# Patient Record
Sex: FEMALE | Race: WHITE | NOT HISPANIC OR LATINO | Employment: FULL TIME | URBAN - METROPOLITAN AREA
[De-identification: names, ages, dates, MRNs, and addresses within clinical notes are randomized per-mention and may not be internally consistent; named-entity substitution may affect disease eponyms.]

---

## 2017-12-05 ENCOUNTER — OFFICE VISIT (OUTPATIENT)
Dept: OBSTETRICS AND GYNECOLOGY | Facility: CLINIC | Age: 57
End: 2017-12-05
Payer: COMMERCIAL

## 2017-12-05 VITALS
HEIGHT: 60 IN | BODY MASS INDEX: 27.57 KG/M2 | SYSTOLIC BLOOD PRESSURE: 120 MMHG | DIASTOLIC BLOOD PRESSURE: 70 MMHG | WEIGHT: 140.44 LBS

## 2017-12-05 DIAGNOSIS — Z01.419 ENCOUNTER FOR GYNECOLOGICAL EXAMINATION (GENERAL) (ROUTINE) WITHOUT ABNORMAL FINDINGS: Primary | ICD-10-CM

## 2017-12-05 DIAGNOSIS — N95.1 HOT FLASHES DUE TO MENOPAUSE: ICD-10-CM

## 2017-12-05 PROCEDURE — 99386 PREV VISIT NEW AGE 40-64: CPT | Mod: S$GLB,,, | Performed by: OBSTETRICS & GYNECOLOGY

## 2017-12-05 PROCEDURE — 99999 PR PBB SHADOW E&M-NEW PATIENT-LVL III: CPT | Mod: PBBFAC,,, | Performed by: OBSTETRICS & GYNECOLOGY

## 2017-12-05 RX ORDER — WARFARIN 10 MG/1
10 TABLET ORAL DAILY
COMMUNITY
End: 2018-03-20

## 2017-12-05 NOTE — PROGRESS NOTES
Chief Complaint   Patient presents with    Well Woman     want to discuss hormone       HPI:  57 y.o. female  presents as a new patient for a well woman exam    - Menopausal: 6-7 YEARS    - History of abnormal paps: [facility] OR LEEP ABOUT 20 YEARS AGO; NORMAL PAPS SINCE THEN  - Postmenopausal bleeding: S/P HYSTERECTOMY  - History of abnormal mammograms: BREAST BIOPSY A FEW YEARS AGO THAT WAS NORMAL; NORMAL MAMMOGRAMS SINCE THEN  - Family history of breast or ovarian cancer: DENIES  - Any breast masses, pain, skin changes, or nipple discharge: DENIES      Pap: No result found, S/P HYSTERECTOMY AND 20 YEARS FROM POSSIBLE DIAGNOSIS OF HIGH GRADE DYSPLASIA  Mammogram: 2016, NORMAL PER PATIENT REPORT    History reviewed. No pertinent past medical history.  Past Surgical History:   Procedure Laterality Date    GALLBLADDER SURGERY      HYSTERECTOMY      MITRAL VALVE REPLACEMENT         Social History   Substance Use Topics    Smoking status: Never Smoker    Smokeless tobacco: Never Used    Alcohol use No     Family History   Problem Relation Age of Onset    Colon cancer Maternal Uncle     Breast cancer Neg Hx     Ovarian cancer Neg Hx      OB History    Para Term  AB Living   3 2 2   1     SAB TAB Ectopic Multiple Live Births   1              # Outcome Date GA Lbr Yanick/2nd Weight Sex Delivery Anes PTL Lv   3 Term      Vag-Spont      2 Term      Vag-Spont      1 SAB                   MEDICATIONS: Reviewed with patient.  ALLERGIES: Patient has no known allergies.     ROS:  Review of Systems   Constitutional: Negative for fever.   Respiratory: Negative for shortness of breath.    Cardiovascular: Negative for chest pain.   Gastrointestinal: Negative for abdominal pain, nausea and vomiting.   Endocrine: Positive for hot flashes.   Genitourinary: Negative for dysuria, pelvic pain and postmenopausal bleeding.   Neurological: Negative for headaches.   Hematological: Does not bruise/bleed easily.    Psychiatric/Behavioral: Negative for depression.   Breast: Negative for breast mass, breast pain, nipple discharge and skin changes      PHYSICAL EXAM:    /70   Ht 5' (1.524 m)   Wt 63.7 kg (140 lb 6.9 oz)   LMP  (LMP Unknown) Comment: hysterectomy  BMI 27.43 kg/m²     Physical Exam:   Constitutional: She is oriented to person, place, and time. She appears well-developed.   No fever    HENT:   Head: Normocephalic.     Neck: No thyromegaly present.    Cardiovascular: Normal rate.     Pulmonary/Chest: Effort normal. Right breast exhibits no mass, no nipple discharge, no skin change, no tenderness and no swelling. Left breast exhibits no mass, no nipple discharge, no skin change, no tenderness and no swelling. Breasts are symmetrical.        Abdominal: She exhibits no mass. There is no hepatosplenomegaly. There is no tenderness.     Genitourinary: Vagina normal. Uterus is absent. Cervix is normal. Right adnexum displays no tenderness and no fullness. Left adnexum displays no tenderness and no fullness. No vaginal discharge found. Cervix exhibits absence.   Genitourinary Comments: External genitalia: Normal  Urethra: No tenderness; normal meatus  Bladder: No tenderness              Lymphadenopathy:     She has no cervical adenopathy.    Neurological: She is alert and oriented to person, place, and time.     Psychiatric: She has a normal mood and affect.         ASSESSMENT & PLAN:   Encounter for gynecological examination (general) (routine) without abnormal findings  -     Mammo Digital Screening Bilat with CAD; Future; Expected date: 12/05/2017    Hot flashes due to menopause        - Breast and pelvic exam: NORMAL BREAST AND POST-HYSTERECTOMY PELVIC EXAM  - Patient was counseled on ASCCP guidelines for cervical cytology screening  - Cervical screening: NOT INDICATED; S/P HYSTERECTOMY AND MORE THAN 20 YEARS SINCE POSSIBLE DIAGNOSIS OF HIGH GRADE CERVICAL DYSPLASIA  - Patient was counseled on current  recommendations for breast cancer screening  - Mammogram screening: WILL SCHEDULE MAMMOGRAM    - She was counseled to follow up with her PCP for other routine health maintenance    - Patient has been on ERT for about 6-7 years  - No ERT over the past 2 months; she notes continued symptoms but not as many as when she started ERT    Patient counseled on the benefit-risk ratio of hormonal replacement therapy including  - Improvement in vasomotor and other climacteric symptoms, including possible improvements in sleep and mood.   - Reduction of risk for osteoporosis  - Study data showing increased risk of thrombo-embolic events such as myocardial infarction and stroke and also possibly increased risk of breast cancer with estrogen replacement  - Side effects such as breast tenderness and weight gain  Patient was also counseled on ACOG's recommendation to use hormone replacement therapy for the relief of hot flashes alone and to be on the lowest dose possible for the shortest amount of time.  Alternative therapy such as herbal and soy-based products, comfort measures, and low-dose SSRIs were reviewed.  All of her questions about this therapy were answered.    - Given the above recommendations and her increased risk of thrombo-embolic events with her mechanical valve, she decided not to restart ERT at this time  - RTC in 3-4 months to re-assess at that time

## 2018-03-06 ENCOUNTER — OFFICE VISIT (OUTPATIENT)
Dept: OBSTETRICS AND GYNECOLOGY | Facility: CLINIC | Age: 58
End: 2018-03-06
Payer: COMMERCIAL

## 2018-03-06 VITALS
DIASTOLIC BLOOD PRESSURE: 70 MMHG | HEIGHT: 62 IN | SYSTOLIC BLOOD PRESSURE: 120 MMHG | WEIGHT: 144.19 LBS | BODY MASS INDEX: 26.53 KG/M2

## 2018-03-06 DIAGNOSIS — N95.1 HOT FLASHES DUE TO MENOPAUSE: Primary | ICD-10-CM

## 2018-03-06 PROCEDURE — 99999 PR PBB SHADOW E&M-EST. PATIENT-LVL III: CPT | Mod: PBBFAC,,, | Performed by: OBSTETRICS & GYNECOLOGY

## 2018-03-06 PROCEDURE — 99213 OFFICE O/P EST LOW 20 MIN: CPT | Mod: S$GLB,,, | Performed by: OBSTETRICS & GYNECOLOGY

## 2018-03-06 RX ORDER — AMOXICILLIN 500 MG/1
CAPSULE ORAL
COMMUNITY
Start: 2018-02-05 | End: 2018-03-20

## 2018-03-06 RX ORDER — WARFARIN SODIUM 5 MG/1
5 TABLET ORAL DAILY
COMMUNITY
Start: 2018-01-06

## 2018-03-09 ENCOUNTER — PATIENT MESSAGE (OUTPATIENT)
Dept: PRIMARY CARE CLINIC | Facility: CLINIC | Age: 58
End: 2018-03-09

## 2018-03-15 ENCOUNTER — TELEPHONE (OUTPATIENT)
Dept: PRIMARY CARE CLINIC | Facility: CLINIC | Age: 58
End: 2018-03-15

## 2018-03-15 NOTE — TELEPHONE ENCOUNTER
----- Message from Shirley Angel sent at 3/15/2018  1:01 PM CDT -----  Contact: Self   Pt is requesting a call back in regards to scheduling her annual visit       Pt can be contacted at [phone]

## 2018-03-20 ENCOUNTER — OFFICE VISIT (OUTPATIENT)
Dept: PRIMARY CARE CLINIC | Facility: CLINIC | Age: 58
End: 2018-03-20
Payer: COMMERCIAL

## 2018-03-20 VITALS
TEMPERATURE: 98 F | DIASTOLIC BLOOD PRESSURE: 63 MMHG | OXYGEN SATURATION: 97 % | SYSTOLIC BLOOD PRESSURE: 115 MMHG | BODY MASS INDEX: 26.31 KG/M2 | RESPIRATION RATE: 18 BRPM | HEIGHT: 62 IN | WEIGHT: 143 LBS | HEART RATE: 72 BPM

## 2018-03-20 DIAGNOSIS — R51.9 NONINTRACTABLE HEADACHE, UNSPECIFIED CHRONICITY PATTERN, UNSPECIFIED HEADACHE TYPE: ICD-10-CM

## 2018-03-20 DIAGNOSIS — C44.622 SQUAMOUS CELL CANCER OF SKIN OF HAND, RIGHT: ICD-10-CM

## 2018-03-20 DIAGNOSIS — Z95.2 HISTORY OF MITRAL VALVE REPLACEMENT: Primary | ICD-10-CM

## 2018-03-20 PROCEDURE — 99999 PR PBB SHADOW E&M-EST. PATIENT-LVL III: CPT | Mod: PBBFAC,,, | Performed by: FAMILY MEDICINE

## 2018-03-20 PROCEDURE — 99396 PREV VISIT EST AGE 40-64: CPT | Mod: S$GLB,,, | Performed by: FAMILY MEDICINE

## 2018-03-20 RX ORDER — ROSUVASTATIN CALCIUM 20 MG/1
20 TABLET, COATED ORAL EVERY OTHER DAY
COMMUNITY
Start: 2018-03-13

## 2018-03-20 RX ORDER — BUTALBITAL, ACETAMINOPHEN AND CAFFEINE 50; 325; 40 MG/1; MG/1; MG/1
1 TABLET ORAL EVERY 4 HOURS PRN
Qty: 30 TABLET | Refills: 1 | Status: SHIPPED | OUTPATIENT
Start: 2018-03-20 | End: 2018-04-19

## 2018-03-20 NOTE — PROGRESS NOTES
Subjective:       Patient ID: Abby Nguyen is a 57 y.o. female.    Chief Complaint: Annual Exam (wellness visit )    HPI: 56 yo WF Wellness --  Had lab in December--.  No specific complaints .  Had echocardiogram within normal limits with Dr. Gross    ROS:  Skin: no psoriasis, eczema, +skin cancer--dorsum of the right hand 2 years ago--squamous cell  HEENT: Occas headache,no  ocular pain, blurred vision, diplopia, epistaxis, hoarseness change in voice, thyroid trouble  Lung: No pneumonia, asthma, Tb, wheezing, SOB, +smoking  Heart: No chest pain, ankle edema, palpitations, MI, oni murmur, hypertension, hyperlipidemia + mitral valve replacement-mechanical on Coumadin  Abdomen: No nausea, vomiting, diarrhea, constipation, ulcers, hepatitis, gallbladder disease, melena, hematochezia, hematemesis  : no UTI, renal disease, stones  MS: no fractures, O/A, lupus, rheumatoid, gout   Neuro: No dizziness, LOC, seizures   No diabetes, no anemia, +  anxiety, no depression        Objective:   Physical Exam:  General: Well nourished, well developed, no acute distress  Skin: No lesions  HEENT: Eyes PERRLA, EOM intact, nose patent, throat non-erythematous   NECK: Supple, no bruits, No JVD, no nodes  Lungs: Clear, no rales, rhonchi, wheezing  Heart: Regular rate and rhythm, no murmurs, gallops, or rubs  Abdomen: flat, bowel sounds positive, no tenderness, or organomegaly  MS: Range of motion and muscle strength intact  Neuro: Alert, CN intact, oriented X 3  Extremities: No cyanosis, clubbing, or edema         Assessment:       1. History of mitral valve replacement    2. Squamous cell cancer of skin of hand, right    3. Nonintractable headache, unspecified chronicity pattern, unspecified headache type        Plan:       History of mitral valve replacement    Squamous cell cancer of skin of hand, right    Nonintractable headache, unspecified chronicity pattern, unspecified headache type    Other orders  -      butalbital-acetaminophen-caffeine -40 mg (FIORICET, ESGIC) -40 mg per tablet; Take 1 tablet by mouth every 4 (four) hours as needed.  Dispense: 30 tablet; Refill: 1      Check PT INR monthly.   Hormones --OBGYN -- on coumadin  Fioricet #30 ! Refill

## 2018-03-23 NOTE — PROGRESS NOTES
"Chief Complaint   Patient presents with    Consult     hormones       HPI:  57 y.o. female     - Menopausal: 6-7 YEARS    - Patient returns to discuss ERT  - Patient was on ERT for about 6-7 years  - She stopped about 6 months ago  - Initially after stopping her symptoms were not too bad, but over the past couple months, she notes more frequent hot flashes  - She has a history of mitral valve replacement and is on coumadin      Pap: No result found, S/P HYSTERECTOMY AND 20 YEARS FROM POSSIBLE DIAGNOSIS OF HIGH GRADE DYSPLASIA  Mammogram: 2018. BIRADS1    History reviewed. No pertinent past medical history.  Past Surgical History:   Procedure Laterality Date    GALLBLADDER SURGERY      HYSTERECTOMY      MITRAL VALVE REPLACEMENT         Social History   Substance Use Topics    Smoking status: Never Smoker    Smokeless tobacco: Never Used    Alcohol use No     Family History   Problem Relation Age of Onset    Colon cancer Maternal Uncle     Breast cancer Neg Hx     Ovarian cancer Neg Hx      OB History    Para Term  AB Living   3 2 2   1     SAB TAB Ectopic Multiple Live Births   1              # Outcome Date GA Lbr Yanick/2nd Weight Sex Delivery Anes PTL Lv   3 Term      Vag-Spont      2 Term      Vag-Spont      1 SAB                   MEDICATIONS: Reviewed with patient.  ALLERGIES: Dilantin [phenytoin sodium extended] and Phenergan [promethazine]     ROS:  Review of Systems   Constitutional: Negative for fever.   Respiratory: Negative for shortness of breath.    Cardiovascular: Negative for chest pain.   Gastrointestinal: Negative for abdominal pain, nausea and vomiting.   Endocrine: Positive for hot flashes.   Genitourinary: Negative for pelvic pain and postmenopausal bleeding.   Neurological: Negative for headaches.       PHYSICAL EXAM:    /70   Ht 5' 2" (1.575 m)   Wt 65.4 kg (144 lb 2.9 oz)   LMP  (LMP Unknown) Comment: hysterectomy  BMI 26.37 kg/m²     Physical Exam: "   Constitutional: She is oriented to person, place, and time. She appears well-developed.    HENT:   Head: Normocephalic.       Pulmonary/Chest: Effort normal.                      Neurological: She is alert and oriented to person, place, and time.     Psychiatric: She has a normal mood and affect.         ASSESSMENT & PLAN:   Hot flashes due to menopause        Patient counseled on the benefit-risk ratio of hormonal replacement therapy including  - Improvement in vasomotor and other climacteric symptoms, including possible improvements in sleep and mood.   - Reduction of risk for osteoporosis  - Study data showing increased risk of thrombo-embolic events such as myocardial infarction and stroke and also possibly increased risk of breast cancer with estrogen replacement  - Side effects such as breast tenderness and weight gain  Patient was also counseled on ACOG's recommendation to use hormone replacement therapy for the relief of hot flashes alone and to be on the lowest dose possible for the shortest amount of time.  Alternative therapy such as herbal and soy-based products, comfort measures, and low-dose SSRIs were reviewed.  All of her questions about this therapy were answered.    - Given the above recommendations and her increased risk of thrombo-embolic events with her mechanical valve, I recommended not re-starting ERT  - Patient declined low-dose SSRIs    Total visit time was 15 minutes with greater than 50% of time dedicated to counseling.

## 2019-03-15 ENCOUNTER — OFFICE VISIT (OUTPATIENT)
Dept: PRIMARY CARE CLINIC | Facility: CLINIC | Age: 59
End: 2019-03-15
Payer: COMMERCIAL

## 2019-03-15 VITALS
HEART RATE: 85 BPM | TEMPERATURE: 100 F | SYSTOLIC BLOOD PRESSURE: 128 MMHG | OXYGEN SATURATION: 99 % | RESPIRATION RATE: 18 BRPM | BODY MASS INDEX: 26.31 KG/M2 | WEIGHT: 143 LBS | HEIGHT: 62 IN | DIASTOLIC BLOOD PRESSURE: 70 MMHG

## 2019-03-15 DIAGNOSIS — Z95.2 HISTORY OF MITRAL VALVE REPLACEMENT: ICD-10-CM

## 2019-03-15 DIAGNOSIS — F41.9 ANXIETY: ICD-10-CM

## 2019-03-15 DIAGNOSIS — C44.622 SQUAMOUS CELL CANCER OF SKIN OF HAND, RIGHT: ICD-10-CM

## 2019-03-15 DIAGNOSIS — F32.A DEPRESSION, UNSPECIFIED DEPRESSION TYPE: ICD-10-CM

## 2019-03-15 DIAGNOSIS — J40 BRONCHITIS: ICD-10-CM

## 2019-03-15 DIAGNOSIS — J32.9 SINUSITIS, UNSPECIFIED CHRONICITY, UNSPECIFIED LOCATION: Primary | ICD-10-CM

## 2019-03-15 PROCEDURE — 99999 PR PBB SHADOW E&M-EST. PATIENT-LVL III: CPT | Mod: PBBFAC,,, | Performed by: FAMILY MEDICINE

## 2019-03-15 PROCEDURE — 99213 OFFICE O/P EST LOW 20 MIN: CPT | Mod: 25,S$GLB,, | Performed by: FAMILY MEDICINE

## 2019-03-15 PROCEDURE — 96372 THER/PROPH/DIAG INJ SC/IM: CPT | Mod: S$GLB,,, | Performed by: FAMILY MEDICINE

## 2019-03-15 PROCEDURE — 99213 PR OFFICE/OUTPT VISIT, EST, LEVL III, 20-29 MIN: ICD-10-PCS | Mod: 25,S$GLB,, | Performed by: FAMILY MEDICINE

## 2019-03-15 PROCEDURE — 96372 PR INJECTION,THERAP/PROPH/DIAG2ST, IM OR SUBCUT: ICD-10-PCS | Mod: S$GLB,,, | Performed by: FAMILY MEDICINE

## 2019-03-15 PROCEDURE — 3008F BODY MASS INDEX DOCD: CPT | Mod: CPTII,S$GLB,, | Performed by: FAMILY MEDICINE

## 2019-03-15 PROCEDURE — 3008F PR BODY MASS INDEX (BMI) DOCUMENTED: ICD-10-PCS | Mod: CPTII,S$GLB,, | Performed by: FAMILY MEDICINE

## 2019-03-15 PROCEDURE — 99999 PR PBB SHADOW E&M-EST. PATIENT-LVL III: ICD-10-PCS | Mod: PBBFAC,,, | Performed by: FAMILY MEDICINE

## 2019-03-15 RX ORDER — AZITHROMYCIN 250 MG/1
TABLET, FILM COATED ORAL
Qty: 6 TABLET | Refills: 0 | Status: SHIPPED | OUTPATIENT
Start: 2019-03-15 | End: 2019-03-19

## 2019-03-15 RX ORDER — TRIAMCINOLONE ACETONIDE 40 MG/ML
40 INJECTION, SUSPENSION INTRA-ARTICULAR; INTRAMUSCULAR
Status: COMPLETED | OUTPATIENT
Start: 2019-03-15 | End: 2019-03-15

## 2019-03-15 RX ORDER — CODEINE PHOSPHATE AND GUAIFENESIN 10; 100 MG/5ML; MG/5ML
5 SOLUTION ORAL EVERY 6 HOURS PRN
Qty: 180 ML | Refills: 0 | Status: SHIPPED | OUTPATIENT
Start: 2019-03-15 | End: 2021-01-25

## 2019-03-15 RX ORDER — METHYLPREDNISOLONE 4 MG/1
TABLET ORAL
Qty: 1 PACKAGE | Refills: 0 | Status: SHIPPED | OUTPATIENT
Start: 2019-03-15 | End: 2021-01-25

## 2019-03-15 RX ADMIN — TRIAMCINOLONE ACETONIDE 40 MG: 40 INJECTION, SUSPENSION INTRA-ARTICULAR; INTRAMUSCULAR at 03:03

## 2019-03-15 NOTE — PROGRESS NOTES
Subjective:       Patient ID: Abby Nguyen is a 58 y.o. female.    Chief Complaint: Cough (congestion) and Generalized Body Aches    HPI 57 yo WF -sick x 4-5 days --no fever, no runny nose but did have a postnasal drip--thought had sinus, +sore throat, +cough, +phlegm--yellow.  No pneumonia asthma TB no smoking no wheezing    ROS:  Skin: no psoriasis, eczema, +skin cancer--dorsum of the right hand 2 years ago--squamous cell  HEENT: no  headache,no  ocular pain, blurred vision, diplopia, epistaxis, hoarseness change in voice, thyroid trouble  Lung: No pneumonia, asthma, Tb, wheezing, SOB, +smoking  Heart: No chest pain, ankle edema, palpitations, MI, oni murmur, hypertension, hyperlipidemia + mitral valve replacement-mechanical on Coumadin saw Dr Ginny Cadena   Abdomen: No nausea, vomiting, diarrhea, constipation, ulcers, hepatitis, gallbladder disease, melena, hematochezia, hematemesis  : no UTI, renal disease, stones  GYN hyst last mammogram due   MS: no fractures, O/A, lupus, rheumatoid, gout   Neuro: No dizziness, LOC, seizures   No diabetes, no anemia, +  anxiety, + depression-- since   3 years.     , pt nurse  at hospital  Lives alone       Objective:   Physical Exam:  General: Well nourished, well developed, no acute distress  Skin: No lesions  HEENT: Eyes PERRLA, EOM intact, nose patent, throat +1/4 erythematous   NECK: Supple, no bruits, No JVD, no nodes  Lungs: Clear, no rales, rhonchi, wheezing + coarse cough   Heart: Regular rate and rhythm, no murmurs, gallops, or rubs  Abdomen: flat, bowel sounds positive, no tenderness, or organomegaly  MS: Range of motion and muscle strength intact  Neuro: Alert, CN intact, oriented X 3  Extremities: No cyanosis, clubbing, or edema         Assessment:       1. Sinusitis, unspecified chronicity, unspecified location    2. Bronchitis    3. Anxiety    4. Depression, unspecified depression type    5. History of mitral valve replacement    6.  Squamous cell cancer of skin of hand, right        Plan:       Sinusitis, unspecified chronicity, unspecified location    Bronchitis    Anxiety    Depression, unspecified depression type    History of mitral valve replacement    Squamous cell cancer of skin of hand, right        Flu swab  Kenalog/Zithromax/Medrol/tussi organidan  Depression and anxiety over death  3 years ago   If patient is not better by Monday may switch to Levaquin do not feel Tamiflu is probably indicated at this time as patient is been sick since Monday  If feels better should be able return to work on Monday

## 2019-03-15 NOTE — PROGRESS NOTES
Patient ID verified by name and . Allergies reviewed with patient. Kenalog 40 mg IM injection given in right VG using aseptic technique. Aspirated with no blood noted. Patient tolerated well. Given per physicians order. No adverse reaction noted.

## 2019-03-20 DIAGNOSIS — Z12.11 COLON CANCER SCREENING: ICD-10-CM

## 2020-02-20 DIAGNOSIS — Z12.39 BREAST CANCER SCREENING: ICD-10-CM

## 2020-04-21 DIAGNOSIS — Z01.84 ANTIBODY RESPONSE EXAMINATION: ICD-10-CM

## 2020-05-14 DIAGNOSIS — Z12.11 COLON CANCER SCREENING: ICD-10-CM

## 2020-09-17 ENCOUNTER — PATIENT MESSAGE (OUTPATIENT)
Dept: ADMINISTRATIVE | Facility: HOSPITAL | Age: 60
End: 2020-09-17

## 2020-10-05 ENCOUNTER — PATIENT MESSAGE (OUTPATIENT)
Dept: ADMINISTRATIVE | Facility: HOSPITAL | Age: 60
End: 2020-10-05

## 2020-12-15 ENCOUNTER — IMMUNIZATION (OUTPATIENT)
Dept: PRIMARY CARE CLINIC | Facility: CLINIC | Age: 60
End: 2020-12-15
Payer: COMMERCIAL

## 2020-12-15 DIAGNOSIS — Z23 NEED FOR VACCINATION: ICD-10-CM

## 2020-12-15 PROCEDURE — 0001A COVID-19, MRNA, LNP-S, PF, 30 MCG/0.3 ML DOSE VACCINE: ICD-10-PCS | Mod: CV19,,, | Performed by: FAMILY MEDICINE

## 2020-12-15 PROCEDURE — 91300 COVID-19, MRNA, LNP-S, PF, 30 MCG/0.3 ML DOSE VACCINE: ICD-10-PCS | Mod: ,,, | Performed by: FAMILY MEDICINE

## 2020-12-15 PROCEDURE — 91300 COVID-19, MRNA, LNP-S, PF, 30 MCG/0.3 ML DOSE VACCINE: CPT | Mod: ,,, | Performed by: FAMILY MEDICINE

## 2020-12-15 PROCEDURE — 0001A COVID-19, MRNA, LNP-S, PF, 30 MCG/0.3 ML DOSE VACCINE: CPT | Mod: CV19,,, | Performed by: FAMILY MEDICINE

## 2021-01-04 ENCOUNTER — PATIENT MESSAGE (OUTPATIENT)
Dept: ADMINISTRATIVE | Facility: HOSPITAL | Age: 61
End: 2021-01-04

## 2021-01-05 ENCOUNTER — IMMUNIZATION (OUTPATIENT)
Dept: PRIMARY CARE CLINIC | Facility: CLINIC | Age: 61
End: 2021-01-05
Payer: COMMERCIAL

## 2021-01-05 DIAGNOSIS — Z23 NEED FOR VACCINATION: ICD-10-CM

## 2021-01-05 PROCEDURE — 0002A COVID-19, MRNA, LNP-S, PF, 30 MCG/0.3 ML DOSE VACCINE: CPT | Mod: PBBFAC | Performed by: EMERGENCY MEDICINE

## 2021-01-05 PROCEDURE — 91300 COVID-19, MRNA, LNP-S, PF, 30 MCG/0.3 ML DOSE VACCINE: CPT | Mod: PBBFAC | Performed by: EMERGENCY MEDICINE

## 2021-01-25 ENCOUNTER — OFFICE VISIT (OUTPATIENT)
Dept: PRIMARY CARE CLINIC | Facility: CLINIC | Age: 61
End: 2021-01-25
Payer: COMMERCIAL

## 2021-01-25 VITALS
HEART RATE: 71 BPM | DIASTOLIC BLOOD PRESSURE: 68 MMHG | TEMPERATURE: 98 F | SYSTOLIC BLOOD PRESSURE: 110 MMHG | RESPIRATION RATE: 19 BRPM | OXYGEN SATURATION: 97 % | WEIGHT: 125.44 LBS | HEIGHT: 62 IN | BODY MASS INDEX: 23.08 KG/M2

## 2021-01-25 DIAGNOSIS — Z85.828 HISTORY OF SKIN CANCER: ICD-10-CM

## 2021-01-25 DIAGNOSIS — Z11.4 ENCOUNTER FOR SCREENING FOR HIV: ICD-10-CM

## 2021-01-25 DIAGNOSIS — R51.9 NONINTRACTABLE HEADACHE, UNSPECIFIED CHRONICITY PATTERN, UNSPECIFIED HEADACHE TYPE: ICD-10-CM

## 2021-01-25 DIAGNOSIS — Z12.31 VISIT FOR SCREENING MAMMOGRAM: ICD-10-CM

## 2021-01-25 DIAGNOSIS — Z11.59 NEED FOR HEPATITIS C SCREENING TEST: ICD-10-CM

## 2021-01-25 DIAGNOSIS — Z95.2 HISTORY OF MITRAL VALVE REPLACEMENT: Primary | ICD-10-CM

## 2021-01-25 PROCEDURE — 99999 PR PBB SHADOW E&M-EST. PATIENT-LVL III: ICD-10-PCS | Mod: PBBFAC,,, | Performed by: FAMILY MEDICINE

## 2021-01-25 PROCEDURE — 3008F BODY MASS INDEX DOCD: CPT | Mod: CPTII,S$GLB,, | Performed by: FAMILY MEDICINE

## 2021-01-25 PROCEDURE — 99999 PR PBB SHADOW E&M-EST. PATIENT-LVL III: CPT | Mod: PBBFAC,,, | Performed by: FAMILY MEDICINE

## 2021-01-25 PROCEDURE — 99213 OFFICE O/P EST LOW 20 MIN: CPT | Mod: S$GLB,,, | Performed by: FAMILY MEDICINE

## 2021-01-25 PROCEDURE — 3008F PR BODY MASS INDEX (BMI) DOCUMENTED: ICD-10-PCS | Mod: CPTII,S$GLB,, | Performed by: FAMILY MEDICINE

## 2021-01-25 PROCEDURE — 99213 PR OFFICE/OUTPT VISIT, EST, LEVL III, 20-29 MIN: ICD-10-PCS | Mod: S$GLB,,, | Performed by: FAMILY MEDICINE

## 2021-01-25 PROCEDURE — 1126F AMNT PAIN NOTED NONE PRSNT: CPT | Mod: S$GLB,,, | Performed by: FAMILY MEDICINE

## 2021-01-25 PROCEDURE — 1126F PR PAIN SEVERITY QUANTIFIED, NO PAIN PRESENT: ICD-10-PCS | Mod: S$GLB,,, | Performed by: FAMILY MEDICINE

## 2021-02-23 ENCOUNTER — HOSPITAL ENCOUNTER (OUTPATIENT)
Dept: RADIOLOGY | Facility: CLINIC | Age: 61
Discharge: HOME | End: 2021-02-23
Attending: FAMILY MEDICINE
Payer: COMMERCIAL

## 2021-02-23 DIAGNOSIS — Z12.31 VISIT FOR SCREENING MAMMOGRAM: ICD-10-CM

## 2021-02-23 PROCEDURE — 77063 BREAST TOMOSYNTHESIS BI: CPT | Mod: 26,,, | Performed by: RADIOLOGY

## 2021-02-23 PROCEDURE — 77067 MAMMO DIGITAL SCREENING BILAT WITH TOMO: ICD-10-PCS | Mod: 26,,, | Performed by: RADIOLOGY

## 2021-02-23 PROCEDURE — 77067 SCR MAMMO BI INCL CAD: CPT | Mod: 26,,, | Performed by: RADIOLOGY

## 2021-02-23 PROCEDURE — 77067 SCR MAMMO BI INCL CAD: CPT | Mod: TC,PO

## 2021-02-23 PROCEDURE — 77063 MAMMO DIGITAL SCREENING BILAT WITH TOMO: ICD-10-PCS | Mod: 26,,, | Performed by: RADIOLOGY

## 2021-04-05 ENCOUNTER — PATIENT MESSAGE (OUTPATIENT)
Dept: ADMINISTRATIVE | Facility: HOSPITAL | Age: 61
End: 2021-04-05

## 2021-07-06 ENCOUNTER — PATIENT MESSAGE (OUTPATIENT)
Dept: ADMINISTRATIVE | Facility: HOSPITAL | Age: 61
End: 2021-07-06

## 2021-10-01 ENCOUNTER — IMMUNIZATION (OUTPATIENT)
Dept: PRIMARY CARE CLINIC | Facility: CLINIC | Age: 61
End: 2021-10-01
Payer: COMMERCIAL

## 2021-10-01 DIAGNOSIS — Z23 NEED FOR VACCINATION: Primary | ICD-10-CM

## 2021-10-01 PROCEDURE — 91300 COVID-19, MRNA, LNP-S, PF, 30 MCG/0.3 ML DOSE VACCINE: CPT | Mod: PBBFAC | Performed by: FAMILY MEDICINE

## 2021-10-05 ENCOUNTER — PATIENT MESSAGE (OUTPATIENT)
Dept: ADMINISTRATIVE | Facility: HOSPITAL | Age: 61
End: 2021-10-05

## 2022-03-14 DIAGNOSIS — Z12.31 OTHER SCREENING MAMMOGRAM: ICD-10-CM

## 2022-03-17 ENCOUNTER — PATIENT MESSAGE (OUTPATIENT)
Dept: ADMINISTRATIVE | Facility: HOSPITAL | Age: 62
End: 2022-03-17
Payer: COMMERCIAL

## 2022-04-07 ENCOUNTER — OFFICE VISIT (OUTPATIENT)
Dept: OBSTETRICS AND GYNECOLOGY | Facility: CLINIC | Age: 62
End: 2022-04-07
Payer: COMMERCIAL

## 2022-04-07 VITALS
HEIGHT: 62 IN | SYSTOLIC BLOOD PRESSURE: 108 MMHG | DIASTOLIC BLOOD PRESSURE: 78 MMHG | WEIGHT: 128.31 LBS | BODY MASS INDEX: 23.61 KG/M2

## 2022-04-07 DIAGNOSIS — Z98.890 HISTORY OF CONIZATION OF CERVIX: ICD-10-CM

## 2022-04-07 DIAGNOSIS — Z01.419 WELL WOMAN EXAM WITH ROUTINE GYNECOLOGICAL EXAM: Primary | ICD-10-CM

## 2022-04-07 PROCEDURE — 3078F DIAST BP <80 MM HG: CPT | Mod: CPTII,S$GLB,, | Performed by: STUDENT IN AN ORGANIZED HEALTH CARE EDUCATION/TRAINING PROGRAM

## 2022-04-07 PROCEDURE — 3008F PR BODY MASS INDEX (BMI) DOCUMENTED: ICD-10-PCS | Mod: CPTII,S$GLB,, | Performed by: STUDENT IN AN ORGANIZED HEALTH CARE EDUCATION/TRAINING PROGRAM

## 2022-04-07 PROCEDURE — 99386 PREV VISIT NEW AGE 40-64: CPT | Mod: S$GLB,,, | Performed by: STUDENT IN AN ORGANIZED HEALTH CARE EDUCATION/TRAINING PROGRAM

## 2022-04-07 PROCEDURE — 3078F PR MOST RECENT DIASTOLIC BLOOD PRESSURE < 80 MM HG: ICD-10-PCS | Mod: CPTII,S$GLB,, | Performed by: STUDENT IN AN ORGANIZED HEALTH CARE EDUCATION/TRAINING PROGRAM

## 2022-04-07 PROCEDURE — 1160F PR REVIEW ALL MEDS BY PRESCRIBER/CLIN PHARMACIST DOCUMENTED: ICD-10-PCS | Mod: CPTII,S$GLB,, | Performed by: STUDENT IN AN ORGANIZED HEALTH CARE EDUCATION/TRAINING PROGRAM

## 2022-04-07 PROCEDURE — 99999 PR PBB SHADOW E&M-EST. PATIENT-LVL III: CPT | Mod: PBBFAC,,, | Performed by: STUDENT IN AN ORGANIZED HEALTH CARE EDUCATION/TRAINING PROGRAM

## 2022-04-07 PROCEDURE — 99386 PR PREVENTIVE VISIT,NEW,40-64: ICD-10-PCS | Mod: S$GLB,,, | Performed by: STUDENT IN AN ORGANIZED HEALTH CARE EDUCATION/TRAINING PROGRAM

## 2022-04-07 PROCEDURE — 1160F RVW MEDS BY RX/DR IN RCRD: CPT | Mod: CPTII,S$GLB,, | Performed by: STUDENT IN AN ORGANIZED HEALTH CARE EDUCATION/TRAINING PROGRAM

## 2022-04-07 PROCEDURE — 3074F SYST BP LT 130 MM HG: CPT | Mod: CPTII,S$GLB,, | Performed by: STUDENT IN AN ORGANIZED HEALTH CARE EDUCATION/TRAINING PROGRAM

## 2022-04-07 PROCEDURE — 1159F PR MEDICATION LIST DOCUMENTED IN MEDICAL RECORD: ICD-10-PCS | Mod: CPTII,S$GLB,, | Performed by: STUDENT IN AN ORGANIZED HEALTH CARE EDUCATION/TRAINING PROGRAM

## 2022-04-07 PROCEDURE — 3074F PR MOST RECENT SYSTOLIC BLOOD PRESSURE < 130 MM HG: ICD-10-PCS | Mod: CPTII,S$GLB,, | Performed by: STUDENT IN AN ORGANIZED HEALTH CARE EDUCATION/TRAINING PROGRAM

## 2022-04-07 PROCEDURE — 99999 PR PBB SHADOW E&M-EST. PATIENT-LVL III: ICD-10-PCS | Mod: PBBFAC,,, | Performed by: STUDENT IN AN ORGANIZED HEALTH CARE EDUCATION/TRAINING PROGRAM

## 2022-04-07 PROCEDURE — 3008F BODY MASS INDEX DOCD: CPT | Mod: CPTII,S$GLB,, | Performed by: STUDENT IN AN ORGANIZED HEALTH CARE EDUCATION/TRAINING PROGRAM

## 2022-04-07 PROCEDURE — 1159F MED LIST DOCD IN RCRD: CPT | Mod: CPTII,S$GLB,, | Performed by: STUDENT IN AN ORGANIZED HEALTH CARE EDUCATION/TRAINING PROGRAM

## 2022-04-08 NOTE — PROGRESS NOTES
History & Physical  Gynecology      SUBJECTIVE:     Chief Complaint: Well Woman       History of Present Illness:  61 y.o.   here for [employer]. She is postmenopausal.  She has no unusual complaints.   S/p hysterectomy 25 years ago. Had mitral valve replacement in  and was started on Coumadin. Shortly after had excessive bleeding prompting hysterectomy.  Reports cervical conization for abnormal paps about 10 years before her hysterectomy. States paps after were always normal. Dysplasia had resolved at the time of her hyst.     Sexually active: No    Cervical cancer screening: n/a    Breast cancer screening: due  Colon cancer screening: due, has been putting off. Doesn't really want to do colonoscopy.     Family history breast cancer: no  Family history ovarian cancer: no  Family history colon cancer: maternal aunt and maternal uncle      Review of patient's allergies indicates:   Allergen Reactions    Dilantin [phenytoin sodium extended]     Phenergan [promethazine]        History reviewed. No pertinent past medical history.  Past Surgical History:   Procedure Laterality Date    BREAST BIOPSY Right     benign    GALLBLADDER SURGERY      HYSTERECTOMY      MITRAL VALVE REPLACEMENT       OB History        3    Para   2    Term   2            AB   1    Living           SAB   1    IAB        Ectopic        Multiple        Live Births                   Family History   Problem Relation Age of Onset    Colon cancer Maternal Uncle     Breast cancer Neg Hx     Ovarian cancer Neg Hx      Social History     Tobacco Use    Smoking status: Never Smoker    Smokeless tobacco: Never Used   Substance Use Topics    Alcohol use: Yes     Comment: socially.    Drug use: No       Current Outpatient Medications   Medication Sig    rosuvastatin (CRESTOR) 20 MG tablet Take 20 mg by mouth every other day. Pt takes it every other day    warfarin (COUMADIN) 5 MG tablet Take 5 mg by mouth Daily.      No current  facility-administered medications for this visit.         Review of Systems:  Review of Systems   Constitutional: Negative for chills and fever.   HENT: Negative for nasal congestion.    Respiratory: Negative for cough and shortness of breath.    Cardiovascular: Negative for chest pain, palpitations and leg swelling.   Gastrointestinal: Negative for abdominal pain, blood in stool, constipation, diarrhea, nausea and vomiting.   Endocrine: Negative for diabetes, hyperthyroidism and hypothyroidism.   Genitourinary: Negative for dysuria, pelvic pain, vaginal discharge and vaginal pain.   Musculoskeletal: Negative for myalgias.   Integumentary:  Negative for rash, hair changes, breast mass, nipple discharge, breast skin changes and breast tenderness.   Neurological: Negative for seizures and syncope.   Psychiatric/Behavioral: Negative for depression. The patient is not nervous/anxious.    Breast: Negative for lump, mass, mastodynia, nipple discharge, skin changes and tenderness       OBJECTIVE:     Physical Exam:  Physical Exam  Exam conducted with a chaperone present.   Constitutional:       General: She is not in acute distress.     Appearance: Normal appearance.   HENT:      Head: Normocephalic and atraumatic.      Nose: No epistaxis.   Eyes:      Extraocular Movements: Extraocular movements intact.      Conjunctiva/sclera: Conjunctivae normal.   Pulmonary:      Effort: Pulmonary effort is normal. No respiratory distress.   Chest:   Breasts:      Right: Normal. No swelling, bleeding, inverted nipple, mass, nipple discharge, skin change, tenderness or axillary adenopathy.      Left: Normal. No swelling, bleeding, inverted nipple, mass, nipple discharge, skin change, tenderness or axillary adenopathy.       Abdominal:      General: Abdomen is flat. There is no distension.      Palpations: Abdomen is soft. There is no mass.      Tenderness: There is no abdominal tenderness. There is no guarding or rebound.      Hernia:  No hernia is present.   Genitourinary:     General: Normal vulva.      Labia:         Right: No rash, tenderness or lesion.         Left: No rash, tenderness or lesion.       Urethra: No prolapse, urethral pain, urethral swelling or urethral lesion.      Vagina: Normal. No vaginal discharge, tenderness or bleeding.      Uterus: Absent.       Adnexa: Right adnexa normal and left adnexa normal.        Right: No mass, tenderness or fullness.          Left: No mass, tenderness or fullness.     Musculoskeletal:         General: No swelling or deformity. Normal range of motion.      Cervical back: Normal range of motion and neck supple.      Right lower leg: No edema.      Left lower leg: No edema.   Lymphadenopathy:      Upper Body:      Right upper body: No axillary adenopathy.      Left upper body: No axillary adenopathy.   Skin:     General: Skin is warm and dry.   Neurological:      Mental Status: She is alert and oriented to person, place, and time.   Psychiatric:         Mood and Affect: Mood normal.         Behavior: Behavior normal.           ASSESSMENT:       ICD-10-CM ICD-9-CM    1. Well woman exam with routine gynecological exam  Z01.419 V72.31    2. History of conization of cervix  Z98.890 V45.89           Plan:      Abby was seen today for well woman.    Diagnoses and all orders for this visit:    Well woman exam with routine gynecological exam   Paps deferred indefinitely, see below   MMG ordered per PCP, scheduling facilitated today   Encouraged f/u for colon ca screening; made aware of at-home testing options. To d/w PCP.    History of conization of cervix   Dysplasia treated late 1980s per pt report, would have needed to be followed 20-25 years after conization. Reports she was followed adequately and all paps were normal leading up to her hyst and after her hyst. Based on history does not need further screening. As we do not have pathology/records, offered final pap today. She declines.     Follow up  for [employer], or sooner as needed.     Anna Pérez

## 2022-05-31 ENCOUNTER — HOSPITAL ENCOUNTER (OUTPATIENT)
Dept: RADIOLOGY | Facility: CLINIC | Age: 62
Discharge: HOME | End: 2022-05-31
Attending: FAMILY MEDICINE
Payer: COMMERCIAL

## 2022-05-31 DIAGNOSIS — Z12.31 OTHER SCREENING MAMMOGRAM: ICD-10-CM

## 2022-05-31 PROCEDURE — 77067 SCR MAMMO BI INCL CAD: CPT | Mod: TC,PO

## 2022-05-31 PROCEDURE — 77063 MAMMO DIGITAL SCREENING BILAT WITH TOMO: ICD-10-PCS | Mod: 26,,, | Performed by: RADIOLOGY

## 2022-05-31 PROCEDURE — 77067 MAMMO DIGITAL SCREENING BILAT WITH TOMO: ICD-10-PCS | Mod: 26,,, | Performed by: RADIOLOGY

## 2022-05-31 PROCEDURE — 77063 BREAST TOMOSYNTHESIS BI: CPT | Mod: 26,,, | Performed by: RADIOLOGY

## 2022-05-31 PROCEDURE — 77063 BREAST TOMOSYNTHESIS BI: CPT | Mod: TC,PO

## 2022-05-31 PROCEDURE — 77067 SCR MAMMO BI INCL CAD: CPT | Mod: 26,,, | Performed by: RADIOLOGY

## 2022-09-27 ENCOUNTER — PATIENT OUTREACH (OUTPATIENT)
Dept: ADMINISTRATIVE | Facility: HOSPITAL | Age: 62
End: 2022-09-27
Payer: COMMERCIAL

## 2022-09-27 NOTE — PROGRESS NOTES
Health Maintenance Due   Topic Date Due    TETANUS VACCINE  Never done    Shingles Vaccine (1 of 2) Never done    Colorectal Cancer Screening  Never done    COVID-19 Vaccine (4 - Booster for Pfizer series) 11/26/2021    Influenza Vaccine (1) 09/01/2022          Left VM message for patient to call office to make annual appointment

## 2022-11-01 ENCOUNTER — OFFICE VISIT (OUTPATIENT)
Dept: DERMATOLOGY | Facility: CLINIC | Age: 62
End: 2022-11-01
Payer: COMMERCIAL

## 2022-11-01 DIAGNOSIS — D48.5 NEOPLASM OF UNCERTAIN BEHAVIOR OF SKIN: Primary | ICD-10-CM

## 2022-11-01 PROCEDURE — 99499 NO LOS: ICD-10-PCS | Mod: S$GLB,,, | Performed by: STUDENT IN AN ORGANIZED HEALTH CARE EDUCATION/TRAINING PROGRAM

## 2022-11-01 PROCEDURE — 1160F PR REVIEW ALL MEDS BY PRESCRIBER/CLIN PHARMACIST DOCUMENTED: ICD-10-PCS | Mod: CPTII,S$GLB,, | Performed by: STUDENT IN AN ORGANIZED HEALTH CARE EDUCATION/TRAINING PROGRAM

## 2022-11-01 PROCEDURE — 99499 UNLISTED E&M SERVICE: CPT | Mod: S$GLB,,, | Performed by: STUDENT IN AN ORGANIZED HEALTH CARE EDUCATION/TRAINING PROGRAM

## 2022-11-01 PROCEDURE — 88305 TISSUE EXAM BY PATHOLOGIST: CPT | Performed by: PATHOLOGY

## 2022-11-01 PROCEDURE — 1159F MED LIST DOCD IN RCRD: CPT | Mod: CPTII,S$GLB,, | Performed by: STUDENT IN AN ORGANIZED HEALTH CARE EDUCATION/TRAINING PROGRAM

## 2022-11-01 PROCEDURE — 88305 TISSUE EXAM BY PATHOLOGIST: CPT | Mod: 26,,, | Performed by: PATHOLOGY

## 2022-11-01 PROCEDURE — 1159F PR MEDICATION LIST DOCUMENTED IN MEDICAL RECORD: ICD-10-PCS | Mod: CPTII,S$GLB,, | Performed by: STUDENT IN AN ORGANIZED HEALTH CARE EDUCATION/TRAINING PROGRAM

## 2022-11-01 PROCEDURE — 11102 TANGNTL BX SKIN SINGLE LES: CPT | Mod: S$GLB,,, | Performed by: STUDENT IN AN ORGANIZED HEALTH CARE EDUCATION/TRAINING PROGRAM

## 2022-11-01 PROCEDURE — 1160F RVW MEDS BY RX/DR IN RCRD: CPT | Mod: CPTII,S$GLB,, | Performed by: STUDENT IN AN ORGANIZED HEALTH CARE EDUCATION/TRAINING PROGRAM

## 2022-11-01 PROCEDURE — 99999 PR PBB SHADOW E&M-EST. PATIENT-LVL III: CPT | Mod: PBBFAC,,, | Performed by: STUDENT IN AN ORGANIZED HEALTH CARE EDUCATION/TRAINING PROGRAM

## 2022-11-01 PROCEDURE — 99999 PR PBB SHADOW E&M-EST. PATIENT-LVL III: ICD-10-PCS | Mod: PBBFAC,,, | Performed by: STUDENT IN AN ORGANIZED HEALTH CARE EDUCATION/TRAINING PROGRAM

## 2022-11-01 PROCEDURE — 88305 TISSUE EXAM BY PATHOLOGIST: ICD-10-PCS | Mod: 26,,, | Performed by: PATHOLOGY

## 2022-11-01 PROCEDURE — 11102 PR TANGENTIAL BIOPSY, SKIN, SINGLE LESION: ICD-10-PCS | Mod: S$GLB,,, | Performed by: STUDENT IN AN ORGANIZED HEALTH CARE EDUCATION/TRAINING PROGRAM

## 2022-11-01 NOTE — PATIENT INSTRUCTIONS
Shave Biopsy Wound Care    Your doctor has performed a shave biopsy today.  A band aid and vaseline ointment has been placed over the site.  This should remain in place for NO LONGER THAN 48 hours.  It is fine to remove the bandaid after 24 hours, if the area is no longer bleeding. It is recommended that you keep the area dry (do not wet)) for the first 24 hours.  After 24 hours, wash the area with warm soap and water and apply Vaseline jelly.  Many patients prefer to use Neosporin or Bacitracin ointment.  This is acceptable; however, know that you can develop an allergy to this medication even if you have used it safely for years.  It is important to keep the area moist.  Letting it dry out and get air slows healing time, and will worsen the scar.        If you notice increasing redness, tenderness, pain, or yellow drainage at the biopsy site, please notify your doctor.  These are signs of an infection.    If your biopsy site is bleeding, apply firm pressure for 15 minutes straight.  Repeat for another 15 minutes, if it is still bleeding.   If the surgical site continues to bleed, then please contact your doctor.      For [facility] users:   You will receive your biopsy results in [facility] as soon as they are available. Please be assured that your physician/provider will review your results and will then determine what further treatment, evaluation, or planning is required. You should be contacted by your physician's/provider's office within 5 business days of receiving your results; If not, please reach out to directly. This is one more way [facility] is putting you first.     [facility] [place] [address]/ [phone] FAX/ [email or web address]

## 2022-11-01 NOTE — PROGRESS NOTES
Subjective:       Patient ID:  Abby Nguyen is a 62 y.o. female who presents for   Chief Complaint   Patient presents with    Lesion     L foot     Patient with new complaint of lesion(s)  Location: L foot  Duration: 1 mth  Symptoms: itches  Relieving factors/Previous treatments: none        H/o SCC on right hand s/p mohs. New lesion on the foot for 1 month that looks similar    Review of Systems   Skin:  Positive for daily sunscreen use and activity-related sunscreen use. Negative for recent sunburn and wears hat.   Hematologic/Lymphatic: Bruises/bleeds easily.      Objective:    Physical Exam   Constitutional: She appears well-developed and well-nourished. No distress.   Neurological: She is alert and oriented to person, place, and time. She is not disoriented.   Psychiatric: She has a normal mood and affect.   Skin:   Areas Examined (abnormalities noted in diagram):   LLE Inspection Performed           Diagram Legend     Erythematous scaling macule/papule c/w actinic keratosis       Vascular papule c/w angioma      Pigmented verrucoid papule/plaque c/w seborrheic keratosis      Yellow umbilicated papule c/w sebaceous hyperplasia      Irregularly shaped tan macule c/w lentigo     1-2 mm smooth white papules consistent with Milia      Movable subcutaneous cyst with punctum c/w epidermal inclusion cyst      Subcutaneous movable cyst c/w pilar cyst      Firm pink to brown papule c/w dermatofibroma      Pedunculated fleshy papule(s) c/w skin tag(s)      Evenly pigmented macule c/w junctional nevus     Mildly variegated pigmented, slightly irregular-bordered macule c/w mildly atypical nevus      Flesh colored to evenly pigmented papule c/w intradermal nevus       Pink pearly papule/plaque c/w basal cell carcinoma      Erythematous hyperkeratotic cursted plaque c/w SCC      Surgical scar with no sign of skin cancer recurrence      Open and closed comedones      Inflammatory papules and pustules      Verrucoid  papule consistent consistent with wart     Erythematous eczematous patches and plaques     Dystrophic onycholytic nail with subungual debris c/w onychomycosis     Umbilicated papule    Erythematous-base heme-crusted tan verrucoid plaque consistent with inflamed seborrheic keratosis     Erythematous Silvery Scaling Plaque c/w Psoriasis     See annotation      Assessment / Plan:      Pathology Orders:       Normal Orders This Visit    Specimen to Pathology, Dermatology     Questions:    Procedure Type: Dermatology and skin neoplasms    Number of Specimens: 1    ------------------------: -------------------------    Spec 1 Procedure: Biopsy    Spec 1 Clinical Impression: SCC, KA    Spec 1 Source: left medial foot    Release to patient: Immediate          Neoplasm of uncertain behavior of skin--favor a keratoacanthoma  - appeared 1 month ago and morphology is c/w KA.     -     Specimen to Pathology, Dermatology    Shave biopsy procedure note:    Shave biopsy performed after verbal consent including risk of infection, scar, recurrence, need for additional treatment of site. Area prepped with alcohol, anesthetized with approximately 1.0cc of 1% lidocaine with epinephrine. Lesional tissue shaved with razor blade. Hemostasis achieved with application of aluminum chloride followed by hyfrecation. No complications. Dressing applied. Wound care explained.           Follow up if symptoms worsen or fail to improve, for For TBSE.

## 2022-11-09 LAB
FINAL PATHOLOGIC DIAGNOSIS: NORMAL
GROSS: NORMAL
Lab: NORMAL
MICROSCOPIC EXAM: NORMAL
SUPPLEMENTAL DIAGNOSIS: NORMAL

## 2022-11-09 NOTE — PROGRESS NOTES
Biopsy without definitive skin cancer but did show changes that could be c/w KA. Given relatively high clinical suspicion. Will have the patient RTC for re-evaluation and consideration of ED&C    To derm staff: please schedule f/u for possible ED&C within the next few weeks

## 2022-11-21 ENCOUNTER — OFFICE VISIT (OUTPATIENT)
Dept: DERMATOLOGY | Facility: CLINIC | Age: 62
End: 2022-11-21
Payer: COMMERCIAL

## 2022-11-21 DIAGNOSIS — D48.5 NEOPLASM OF UNCERTAIN BEHAVIOR OF SKIN: Primary | ICD-10-CM

## 2022-11-21 PROCEDURE — 88305 TISSUE EXAM BY PATHOLOGIST: ICD-10-PCS | Mod: 26,,, | Performed by: PATHOLOGY

## 2022-11-21 PROCEDURE — 99999 PR PBB SHADOW E&M-EST. PATIENT-LVL III: ICD-10-PCS | Mod: PBBFAC,,, | Performed by: STUDENT IN AN ORGANIZED HEALTH CARE EDUCATION/TRAINING PROGRAM

## 2022-11-21 PROCEDURE — 1160F PR REVIEW ALL MEDS BY PRESCRIBER/CLIN PHARMACIST DOCUMENTED: ICD-10-PCS | Mod: CPTII,S$GLB,, | Performed by: STUDENT IN AN ORGANIZED HEALTH CARE EDUCATION/TRAINING PROGRAM

## 2022-11-21 PROCEDURE — 1159F PR MEDICATION LIST DOCUMENTED IN MEDICAL RECORD: ICD-10-PCS | Mod: CPTII,S$GLB,, | Performed by: STUDENT IN AN ORGANIZED HEALTH CARE EDUCATION/TRAINING PROGRAM

## 2022-11-21 PROCEDURE — 99499 UNLISTED E&M SERVICE: CPT | Mod: S$GLB,,, | Performed by: STUDENT IN AN ORGANIZED HEALTH CARE EDUCATION/TRAINING PROGRAM

## 2022-11-21 PROCEDURE — 11102 PR TANGENTIAL BIOPSY, SKIN, SINGLE LESION: ICD-10-PCS | Mod: S$GLB,,, | Performed by: STUDENT IN AN ORGANIZED HEALTH CARE EDUCATION/TRAINING PROGRAM

## 2022-11-21 PROCEDURE — 1160F RVW MEDS BY RX/DR IN RCRD: CPT | Mod: CPTII,S$GLB,, | Performed by: STUDENT IN AN ORGANIZED HEALTH CARE EDUCATION/TRAINING PROGRAM

## 2022-11-21 PROCEDURE — 88305 TISSUE EXAM BY PATHOLOGIST: CPT | Mod: 26,,, | Performed by: PATHOLOGY

## 2022-11-21 PROCEDURE — 11102 TANGNTL BX SKIN SINGLE LES: CPT | Mod: S$GLB,,, | Performed by: STUDENT IN AN ORGANIZED HEALTH CARE EDUCATION/TRAINING PROGRAM

## 2022-11-21 PROCEDURE — 99499 NO LOS: ICD-10-PCS | Mod: S$GLB,,, | Performed by: STUDENT IN AN ORGANIZED HEALTH CARE EDUCATION/TRAINING PROGRAM

## 2022-11-21 PROCEDURE — 99999 PR PBB SHADOW E&M-EST. PATIENT-LVL III: CPT | Mod: PBBFAC,,, | Performed by: STUDENT IN AN ORGANIZED HEALTH CARE EDUCATION/TRAINING PROGRAM

## 2022-11-21 PROCEDURE — 1159F MED LIST DOCD IN RCRD: CPT | Mod: CPTII,S$GLB,, | Performed by: STUDENT IN AN ORGANIZED HEALTH CARE EDUCATION/TRAINING PROGRAM

## 2022-11-21 PROCEDURE — 88305 TISSUE EXAM BY PATHOLOGIST: CPT | Performed by: PATHOLOGY

## 2022-11-21 NOTE — PROGRESS NOTES
Subjective:       Patient ID:  Abby Nguyen is a 62 y.o. female who presents for   Chief Complaint   Patient presents with    Lesion     Left foot      Lesion - Follow-up  Symptom course: unchanged  Affected locations: left foot  Signs / symptoms: redness and scabs  Severity: mild    Patient had biopsy of lesion on left foot 11/11. Initial path showed prurigo nodule but was not c/w history. Here today for repeat biopsy    Review of Systems   Skin:  Positive for daily sunscreen use and activity-related sunscreen use. Negative for recent sunburn and wears hat.   Hematologic/Lymphatic: Bruises/bleeds easily.      Objective:    Physical Exam   Constitutional: She appears well-developed and well-nourished. No distress.   Neurological: She is alert and oriented to person, place, and time. She is not disoriented.   Psychiatric: She has a normal mood and affect.   Skin:   Areas Examined (abnormalities noted in diagram):   LLE Inspection Performed           Diagram Legend     Erythematous scaling macule/papule c/w actinic keratosis       Vascular papule c/w angioma      Pigmented verrucoid papule/plaque c/w seborrheic keratosis      Yellow umbilicated papule c/w sebaceous hyperplasia      Irregularly shaped tan macule c/w lentigo     1-2 mm smooth white papules consistent with Milia      Movable subcutaneous cyst with punctum c/w epidermal inclusion cyst      Subcutaneous movable cyst c/w pilar cyst      Firm pink to brown papule c/w dermatofibroma      Pedunculated fleshy papule(s) c/w skin tag(s)      Evenly pigmented macule c/w junctional nevus     Mildly variegated pigmented, slightly irregular-bordered macule c/w mildly atypical nevus      Flesh colored to evenly pigmented papule c/w intradermal nevus       Pink pearly papule/plaque c/w basal cell carcinoma      Erythematous hyperkeratotic cursted plaque c/w SCC      Surgical scar with no sign of skin cancer recurrence      Open and closed comedones       Inflammatory papules and pustules      Verrucoid papule consistent consistent with wart     Erythematous eczematous patches and plaques     Dystrophic onycholytic nail with subungual debris c/w onychomycosis     Umbilicated papule    Erythematous-base heme-crusted tan verrucoid plaque consistent with inflamed seborrheic keratosis     Erythematous Silvery Scaling Plaque c/w Psoriasis     See annotation                    Assessment / Plan:      Pathology Orders:       Normal Orders This Visit    Specimen to Pathology, Dermatology     Questions:    Procedure Type: Dermatology and skin neoplasms    Number of Specimens: 1    ------------------------: -------------------------    Spec 1 Procedure: Biopsy    Spec 1 Clinical Impression: keratoacanthoma, SCC    Spec 1 Source: left medial foot    Release to patient: Immediate          Neoplasm of uncertain behavior of skin  -     Specimen to Pathology, Dermatology  - repeat biopsy performed of lesion on left foot. Initial clinical suspicion was for KA. Initial biopsy showed prurigo nodule but was superficial and was not c/w history. Repeat biopsy done today to r/o malignancy. Broad and deep shave done of entire lesion to the depth of the dermis    Shave biopsy procedure note:  Shave biopsy performed after verbal consent including risk of infection, scar, recurrence, need for additional treatment of site. Area prepped with alcohol, anesthetized with approximately 1.0cc of 1% lidocaine with epinephrine. Lesional tissue shaved with razor blade. Hemostasis achieved with application of aluminum chloride followed by hyfrecation. No complications. Dressing applied. Wound care explained.           Follow up in about 1 year (around 11/21/2023).

## 2022-12-13 LAB
COMMENT: NORMAL
FINAL PATHOLOGIC DIAGNOSIS: NORMAL
GROSS: NORMAL
Lab: NORMAL
MICROSCOPIC EXAM: NORMAL

## 2022-12-13 NOTE — PROGRESS NOTES
Skin, left medial foot, shave biopsy:   -SQUAMOUS CELL CARCINOMA, KERATOACANTHOMA TYPE    Recommend patient to Dr. Lloyd for Mohs surgery consultation. Picture in chart.     General Derm team: sent patient for Mohs surgery. F/u with me in 6 months.    _______________________  Patient called and notified of results. We discussed treatment options for Rowdy. Mohs surgery is still the gold standard with the highest cure rate. Patient chose to proceed with mohs

## 2023-01-20 ENCOUNTER — PATIENT MESSAGE (OUTPATIENT)
Dept: PRIMARY CARE CLINIC | Facility: CLINIC | Age: 63
End: 2023-01-20
Payer: COMMERCIAL

## 2023-01-20 NOTE — TELEPHONE ENCOUNTER
Called patient and notified her that  is out of office on Thursdays and Fridays. Patient said ok.

## 2023-02-08 ENCOUNTER — PROCEDURE VISIT (OUTPATIENT)
Dept: DERMATOLOGY | Facility: CLINIC | Age: 63
End: 2023-02-08
Payer: COMMERCIAL

## 2023-02-08 VITALS
SYSTOLIC BLOOD PRESSURE: 121 MMHG | DIASTOLIC BLOOD PRESSURE: 81 MMHG | BODY MASS INDEX: 23.55 KG/M2 | HEIGHT: 62 IN | WEIGHT: 128 LBS | HEART RATE: 67 BPM

## 2023-02-08 DIAGNOSIS — C44.729 SQUAMOUS CELL CARCINOMA OF LEFT FOOT: Primary | ICD-10-CM

## 2023-02-08 PROCEDURE — 99499 NO LOS: ICD-10-PCS | Mod: S$GLB,,, | Performed by: DERMATOLOGY

## 2023-02-08 PROCEDURE — 17312: ICD-10-PCS | Mod: S$GLB,,, | Performed by: DERMATOLOGY

## 2023-02-08 PROCEDURE — 17312 MOHS ADDL STAGE: CPT | Mod: S$GLB,,, | Performed by: DERMATOLOGY

## 2023-02-08 PROCEDURE — 17311: ICD-10-PCS | Mod: S$GLB,,, | Performed by: DERMATOLOGY

## 2023-02-08 PROCEDURE — 17311 MOHS 1 STAGE H/N/HF/G: CPT | Mod: S$GLB,,, | Performed by: DERMATOLOGY

## 2023-02-08 PROCEDURE — 99499 UNLISTED E&M SERVICE: CPT | Mod: S$GLB,,, | Performed by: DERMATOLOGY

## 2023-02-08 NOTE — PROGRESS NOTES
PROCEDURE: Mohs' Micrographic Surgery    INDICATION: Biopsy-proven skin cancer of cosmetically and functionally important areas, including head, neck, genital, hand, foot, or areas known for having difficulty in healing, such as the lower anterior legs.    REFERRING PROVIDER:  Noble Garcia MD    CASE NUMBER:     ANESTHETIC: 3 cc 0.5% Lidocaine with Epi 1:200,000 mixed 1:1 with 0.5% Bupivacaine    SURGICAL PREP: Hibiclens    SURGEON: Mami Lloyd MD    ASSISTANTS: Vandana Domínguez MA and Marylu Saldaña Surg Tech    PREOPERATIVE DIAGNOSIS: squamous cell carcinoma, KA type    POSTOPERATIVE DIAGNOSIS: squamous cell carcinoma    PATHOLOGIC DIAGNOSIS: squamous cell carcinoma    HISTOLOGY OF SPECIMENS IN FIRST STAGE:   Tumor Type: Tumor seen. Superficial squamous cell carcinoma: Proliferation of squamous cells exhibiting atypia and infiltrating within the superficial papillary dermis.   Depth of Invasion: epidermis and dermis  Perineural Invasion: No    HISTOLOGY OF SPECIMENS IN SUBSEQUENT STAGES:  Tumor Type:  No tumor seen    STAGES OF MOHS' SURGERY PERFORMED: 2    TUMOR-FREE PLANE ACHIEVED: Yes    HEMOSTASIS: electrocoagulation     SPECIMENS: 3 (2 in stage A and 1 in stage B)    LOCATION: left medial foot. Patient verified location by looking at it and also with photo in Epic.    INITIAL LESION SIZE: 0.8 x 0.8 cm    FINAL DEFECT SIZE: 1.0 x 1.3 cm    WOUND REPAIR/DISPOSITION: When the tumor was completely removed, repair options were discussed with the patient, and it was decided to let the wound heal by second intention. The patient tolerated the procedure well and will consider delayed reconstruction or repair if necessary.    The area was cleaned and dressed appropriately, and the patient was given wound care instructions, as well as an appointment for follow-up evaluation in one month.    Vitals:    02/08/23 1334   BP: 121/81   Pulse: 67

## 2023-02-16 ENCOUNTER — PATIENT MESSAGE (OUTPATIENT)
Dept: SURGERY | Facility: CLINIC | Age: 63
End: 2023-02-16
Payer: COMMERCIAL

## 2023-02-16 ENCOUNTER — TELEPHONE (OUTPATIENT)
Dept: SURGERY | Facility: CLINIC | Age: 63
End: 2023-02-16
Payer: COMMERCIAL

## 2023-02-16 DIAGNOSIS — Z12.11 SCREENING FOR COLON CANCER: Primary | ICD-10-CM

## 2023-02-16 RX ORDER — SODIUM CHLORIDE 0.9 % (FLUSH) 0.9 %
3 SYRINGE (ML) INJECTION
Status: CANCELLED | OUTPATIENT
Start: 2023-02-16

## 2023-02-16 RX ORDER — SODIUM, POTASSIUM,MAG SULFATES 17.5-3.13G
1 SOLUTION, RECONSTITUTED, ORAL ORAL DAILY
Qty: 1 KIT | Refills: 0 | Status: SHIPPED | OUTPATIENT
Start: 2023-02-16 | End: 2023-02-18

## 2023-02-16 NOTE — TELEPHONE ENCOUNTER
The patient has been advised the Colonoscopy Prep Kit will be ordered from the patient's verified preferred pharmacy on file. The medication can  be picked up by the patient, or the patient's designated representative.The patient was further explained the Colonoscopy Prep instructions will be mailed to the patient verified mailing address on file, or put onto the [facility] portal, whichever method of delivery the patient prefers. The patient was asked to please to follow the Colonoscopy Prep instructions being provided as precisely,and  meticulously as possible.The patient was advised you  will receive a follow up phone call to summarize the Colonoscopy Prep instructions prior to the scheduled Colonoscopy procedure date. At this time the patient will be given an opportunity to ask any questions regarding the Colonoscopy procedure, and it's associated Prep instructions.

## 2023-02-16 NOTE — TELEPHONE ENCOUNTER
A call was received from this patient requesting to be scheduled to have a Colonoscopy. A review of this patient's Care Gap, and the information obtained from the patient denotes the Colonoscopy is over due. Based upon the afore listed information, the patient was then scheduled for a Colonoscopy. This patient was advised as follows:    Patient verbally consented to a Colonoscopy and requested to be scheduled for a Colonoscopy on 03/29/2023 - Pending Cardiac Clearance - Anticoagulant Therapy in progress. Patient was advised a designated  is required on the day of the Colonoscopy to drive the patient home and the  must be at least. 18 years old.  It was emphasized, and reiterated to the patient, not to follow the prep instructions that comes with the Prep Kit. However, to please follow the prep instructions that will be received in the mail from the [facility]   Patient acknowledges understanding Prep instructions as explained and discussed on the phone.. Patient was advised the Colonoscopy Prep instructions discussed and explained on the phone,are being mailed out to the patient's verified address on file and put onto the patient portal.. Patient's address on file was verified with the patient for accuracy of mailing. Patient's medications on file was reviewed with the patient for accuracy of information. Patient denies taking  any other medications other than those listed and verified on medication profile.Patient was explained the Colonoscopy will be performed here at [facility]. Patient was further explained the Pre-Op will call one day prior to the procedure date, to discuss Pre-Op instructions;and what time to report for the Colonoscopy. The patient was given the opportunity to ask any questions about the Colonoscopy. No further issues were discussed.

## 2023-03-08 ENCOUNTER — TELEPHONE (OUTPATIENT)
Dept: SURGERY | Facility: CLINIC | Age: 63
End: 2023-03-08
Payer: COMMERCIAL

## 2023-03-08 ENCOUNTER — OFFICE VISIT (OUTPATIENT)
Dept: DERMATOLOGY | Facility: CLINIC | Age: 63
End: 2023-03-08
Payer: COMMERCIAL

## 2023-03-08 DIAGNOSIS — C44.729 SQUAMOUS CELL CARCINOMA OF LEFT FOOT: Primary | ICD-10-CM

## 2023-03-08 PROCEDURE — 99212 PR OFFICE/OUTPT VISIT, EST, LEVL II, 10-19 MIN: ICD-10-PCS | Mod: S$GLB,,, | Performed by: DERMATOLOGY

## 2023-03-08 PROCEDURE — 99999 PR PBB SHADOW E&M-EST. PATIENT-LVL II: CPT | Mod: PBBFAC,,, | Performed by: DERMATOLOGY

## 2023-03-08 PROCEDURE — 1159F MED LIST DOCD IN RCRD: CPT | Mod: CPTII,S$GLB,, | Performed by: DERMATOLOGY

## 2023-03-08 PROCEDURE — 99999 PR PBB SHADOW E&M-EST. PATIENT-LVL II: ICD-10-PCS | Mod: PBBFAC,,, | Performed by: DERMATOLOGY

## 2023-03-08 PROCEDURE — 1159F PR MEDICATION LIST DOCUMENTED IN MEDICAL RECORD: ICD-10-PCS | Mod: CPTII,S$GLB,, | Performed by: DERMATOLOGY

## 2023-03-08 PROCEDURE — 99212 OFFICE O/P EST SF 10 MIN: CPT | Mod: S$GLB,,, | Performed by: DERMATOLOGY

## 2023-03-08 NOTE — TELEPHONE ENCOUNTER
A follow up call was placed to this patient re: the Cardiac Clearance for the upcoming schedule Colonoscopy on 03/29/2023. The patient advise an appointment has been scheduled with the treating Cardiologist (Dr Mynor Gross MD) with regards to a Acardiac Clearance for the scheduled Colonoscopy. Additionally, a Cardiac Clearance request document/letter has been faxed to this patient's treating Cardiologist, for the scheduled Colonoscopy

## 2023-03-08 NOTE — PROGRESS NOTES
62 y.o. female patient is here for wound check after surgery.    Patient reports no problems.    WOUND PE:  The L medial foot wound is well healed with 90%  re-epithelialization. No nodularity. Slight maceration of surrounding skin edges    IMPRESSION:  Healing operative site from Mohs' surgery, SCC L medial foot s/p Mohs with second-intention healing, postop week #4,almost all healed in.    PLAN:  D/c wound care and bandage  Keep center moist with small amount aquaphor x 1 more week, but careful not to get on surrounding macerated skin.   Call if not fully healed in 3 weeks  Daily SPF.  Regular skin checks.    RTC:  In 3-6 months with  Noble Garcia M.D.  for skin check or sooner if new concern arises.

## 2023-04-17 ENCOUNTER — OFFICE VISIT (OUTPATIENT)
Dept: PRIMARY CARE CLINIC | Facility: CLINIC | Age: 63
End: 2023-04-17
Payer: COMMERCIAL

## 2023-04-17 ENCOUNTER — PATIENT MESSAGE (OUTPATIENT)
Dept: PRIMARY CARE CLINIC | Facility: CLINIC | Age: 63
End: 2023-04-17

## 2023-04-17 VITALS
SYSTOLIC BLOOD PRESSURE: 118 MMHG | WEIGHT: 131.75 LBS | TEMPERATURE: 97 F | DIASTOLIC BLOOD PRESSURE: 80 MMHG | HEIGHT: 62 IN | BODY MASS INDEX: 24.24 KG/M2 | HEART RATE: 69 BPM | RESPIRATION RATE: 18 BRPM | OXYGEN SATURATION: 99 %

## 2023-04-17 DIAGNOSIS — Z13.820 OSTEOPOROSIS SCREENING: Primary | ICD-10-CM

## 2023-04-17 DIAGNOSIS — C44.622 SQUAMOUS CELL CANCER OF SKIN OF HAND, RIGHT: ICD-10-CM

## 2023-04-17 DIAGNOSIS — E78.00 PURE HYPERCHOLESTEROLEMIA: ICD-10-CM

## 2023-04-17 DIAGNOSIS — Z12.31 VISIT FOR SCREENING MAMMOGRAM: ICD-10-CM

## 2023-04-17 DIAGNOSIS — Z95.2 HISTORY OF MITRAL VALVE REPLACEMENT: Primary | ICD-10-CM

## 2023-04-17 DIAGNOSIS — Z85.828 HISTORY OF SKIN CANCER: ICD-10-CM

## 2023-04-17 PROBLEM — E78.5 HYPERLIPIDEMIA: Status: ACTIVE | Noted: 2023-04-17

## 2023-04-17 PROCEDURE — 99214 PR OFFICE/OUTPT VISIT, EST, LEVL IV, 30-39 MIN: ICD-10-PCS | Mod: S$GLB,,, | Performed by: FAMILY MEDICINE

## 2023-04-17 PROCEDURE — 3079F PR MOST RECENT DIASTOLIC BLOOD PRESSURE 80-89 MM HG: ICD-10-PCS | Mod: CPTII,S$GLB,, | Performed by: FAMILY MEDICINE

## 2023-04-17 PROCEDURE — 3008F PR BODY MASS INDEX (BMI) DOCUMENTED: ICD-10-PCS | Mod: CPTII,S$GLB,, | Performed by: FAMILY MEDICINE

## 2023-04-17 PROCEDURE — 99999 PR PBB SHADOW E&M-EST. PATIENT-LVL IV: CPT | Mod: PBBFAC,,, | Performed by: FAMILY MEDICINE

## 2023-04-17 PROCEDURE — 99999 PR PBB SHADOW E&M-EST. PATIENT-LVL IV: ICD-10-PCS | Mod: PBBFAC,,, | Performed by: FAMILY MEDICINE

## 2023-04-17 PROCEDURE — 1159F MED LIST DOCD IN RCRD: CPT | Mod: CPTII,S$GLB,, | Performed by: FAMILY MEDICINE

## 2023-04-17 PROCEDURE — 3079F DIAST BP 80-89 MM HG: CPT | Mod: CPTII,S$GLB,, | Performed by: FAMILY MEDICINE

## 2023-04-17 PROCEDURE — 3074F PR MOST RECENT SYSTOLIC BLOOD PRESSURE < 130 MM HG: ICD-10-PCS | Mod: CPTII,S$GLB,, | Performed by: FAMILY MEDICINE

## 2023-04-17 PROCEDURE — 99214 OFFICE O/P EST MOD 30 MIN: CPT | Mod: S$GLB,,, | Performed by: FAMILY MEDICINE

## 2023-04-17 PROCEDURE — 1159F PR MEDICATION LIST DOCUMENTED IN MEDICAL RECORD: ICD-10-PCS | Mod: CPTII,S$GLB,, | Performed by: FAMILY MEDICINE

## 2023-04-17 PROCEDURE — 3074F SYST BP LT 130 MM HG: CPT | Mod: CPTII,S$GLB,, | Performed by: FAMILY MEDICINE

## 2023-04-17 PROCEDURE — 3008F BODY MASS INDEX DOCD: CPT | Mod: CPTII,S$GLB,, | Performed by: FAMILY MEDICINE

## 2023-04-17 NOTE — PROGRESS NOTES
Subjective:       Patient ID: Abby Nguyen is a 62 y.o. female.    Chief Complaint: wellness check     HPI:  62 White female in for wellness--EATING OKAY--+BM--had colonoscopy due to blood stool --large polyp --benign --wants see patient in 6 months and redo scope surgeon felt got it all. Ambulating well     ROS:  Skin: no psoriasis, eczema,+ skin cancer left foot S squamous cell  HEENT: + occas  headache, ocular pain, blurred vision, diplopia, epistaxis, hoarseness change in voice, thyroid trouble  Lung: No pneumonia, asthma, Tb, wheezing, SOB, no smoking  Heart: No chest pain, ankle edema, palpitations, MI, oni murmur, hypertension, +hyperlipidemia--no stent bypass arrhythmia--history mitral valve replacement patient on Coumadin  Abdomen: No nausea, vomiting, diarrhea, constipation, ulcers, hepatitis, gallbladder disease, melena, hematochezia, hematemesis--history bright red blood per rectum see history of present illness  : no UTI, renal disease, stones  MS: no fractures, O/A, lupus, rheumatoid, gout  Neuro: No dizziness, LOC, seizures   No diabetes, no anemia, no anxiety, no depression    2 sons --Grouper tech --lives with son     Objective:   Physical Exam:  General: Well nourished, well developed, no acute distress  Skin: No lesions  HEENT: Eyes PERRLA, EOM intact, nose patent, throat non-erythematous  ears TM cl  NECK: Supple, no bruits, No JVD, no nodes  Lungs: Clear, no rales, rhonchi, wheezing  Heart: Regular rate and rhythm, no murmurs, gallops, or rubs  Abdomen: flat, bowel sounds positive, no tenderness, or organomegaly  MS: Range of motion and muscle strength intact  Neuro: Alert, CN intact, oriented X 3  Extremities: No cyanosis, clubbing, or edema         Assessment:       1. History of mitral valve replacement    2. Pure hypercholesterolemia    3. Squamous cell cancer of skin of hand, right    4. History of skin cancer    5. Visit for screening mammogram        Plan:       History of  mitral valve replacement    Pure hypercholesterolemia  -     CBC Auto Differential; Future; Expected date: 04/17/2023  -     Comprehensive Metabolic Panel; Future; Expected date: 04/17/2023  -     Lipid Panel; Future; Expected date: 04/17/2023  -     X-Ray Chest PA And Lateral; Future; Expected date: 04/17/2023  -     Echo  -     TSH; Future; Expected date: 04/17/2023    Squamous cell cancer of skin of hand, right    History of skin cancer    Visit for screening mammogram  -     Mammo Digital Screening Bilat; Future; Expected date: 04/17/2023        Main reason visit   Wellness visit  Hyperlipidemia on Crestor  Mitral valve replacement on Coumadin  Lab CBCs CMP lipids TSH chest x-ray EKG echocardiogram  Health maintenance COVID vaccine tetanus shingles

## 2023-04-18 ENCOUNTER — TELEPHONE (OUTPATIENT)
Dept: PRIMARY CARE CLINIC | Facility: CLINIC | Age: 63
End: 2023-04-18

## 2023-04-18 NOTE — TELEPHONE ENCOUNTER
----- Message from Cata Emerson sent at 4/18/2023  2:57 PM CDT -----  Please put in order for a dexa patient is requesting this test

## 2023-04-19 DIAGNOSIS — Z13.820 ENCOUNTER FOR OSTEOPOROSIS SCREENING IN ASYMPTOMATIC POSTMENOPAUSAL PATIENT: Primary | ICD-10-CM

## 2023-04-19 DIAGNOSIS — Z78.0 ENCOUNTER FOR OSTEOPOROSIS SCREENING IN ASYMPTOMATIC POSTMENOPAUSAL PATIENT: Primary | ICD-10-CM

## 2023-04-19 NOTE — TELEPHONE ENCOUNTER
Called patient and informed her that the order for the bone density test was in. Patient verbalized understand.

## 2023-04-24 RX ORDER — ALENDRONATE SODIUM 70 MG/1
70 TABLET ORAL
Qty: 12 TABLET | Refills: 3 | Status: SHIPPED | OUTPATIENT
Start: 2023-04-24 | End: 2023-05-07 | Stop reason: SDUPTHER

## 2023-05-03 ENCOUNTER — PATIENT MESSAGE (OUTPATIENT)
Dept: PRIMARY CARE CLINIC | Facility: CLINIC | Age: 63
End: 2023-05-03
Payer: COMMERCIAL

## 2023-05-05 ENCOUNTER — PATIENT MESSAGE (OUTPATIENT)
Dept: PRIMARY CARE CLINIC | Facility: CLINIC | Age: 63
End: 2023-05-05
Payer: COMMERCIAL

## 2023-05-07 RX ORDER — ALENDRONATE SODIUM 70 MG/1
70 TABLET ORAL
Qty: 12 TABLET | Refills: 3 | Status: SHIPPED | OUTPATIENT
Start: 2023-05-07 | End: 2023-07-21 | Stop reason: SDUPTHER

## 2023-06-15 ENCOUNTER — HOSPITAL ENCOUNTER (OUTPATIENT)
Dept: RADIOLOGY | Facility: HOSPITAL | Age: 63
Discharge: HOME | End: 2023-06-15
Attending: FAMILY MEDICINE
Payer: COMMERCIAL

## 2023-06-15 DIAGNOSIS — Z12.31 VISIT FOR SCREENING MAMMOGRAM: ICD-10-CM

## 2023-06-15 PROCEDURE — 77063 MAMMO DIGITAL SCREENING BILAT WITH TOMO: ICD-10-PCS | Mod: 26,,, | Performed by: RADIOLOGY

## 2023-06-15 PROCEDURE — 77063 BREAST TOMOSYNTHESIS BI: CPT | Mod: 26,,, | Performed by: RADIOLOGY

## 2023-06-15 PROCEDURE — 77067 SCR MAMMO BI INCL CAD: CPT | Mod: 26,,, | Performed by: RADIOLOGY

## 2023-06-15 PROCEDURE — 77067 MAMMO DIGITAL SCREENING BILAT WITH TOMO: ICD-10-PCS | Mod: 26,,, | Performed by: RADIOLOGY

## 2023-06-15 PROCEDURE — 77067 SCR MAMMO BI INCL CAD: CPT | Mod: TC

## 2023-07-25 RX ORDER — ALENDRONATE SODIUM 70 MG/1
70 TABLET ORAL
Qty: 12 TABLET | Refills: 3 | OUTPATIENT
Start: 2023-07-25 | End: 2024-07-24

## 2023-07-25 RX ORDER — ALENDRONATE SODIUM 70 MG/1
70 TABLET ORAL
Qty: 12 TABLET | Refills: 0 | Status: SHIPPED | OUTPATIENT
Start: 2023-07-25 | End: 2023-10-17

## 2023-08-03 ENCOUNTER — PATIENT MESSAGE (OUTPATIENT)
Dept: PRIMARY CARE CLINIC | Facility: CLINIC | Age: 63
End: 2023-08-03
Payer: COMMERCIAL

## 2023-08-03 DIAGNOSIS — C44.622 SQUAMOUS CELL CANCER OF SKIN OF HAND, RIGHT: Primary | ICD-10-CM

## 2023-09-05 ENCOUNTER — TELEPHONE (OUTPATIENT)
Dept: GASTROENTEROLOGY | Facility: CLINIC | Age: 63
End: 2023-09-05
Payer: COMMERCIAL

## 2023-09-05 NOTE — TELEPHONE ENCOUNTER
----- Message from Deirdre Lord sent at 9/5/2023  2:26 PM CDT -----  Type:  Needs Medical Advice    Who Called: pt    Would the patient rather a call back or a response via [facility]? call  Best Call Back Number: [phone]  Additional Information: pt requesting a call back to discuss being re scoped was told to be scheduled in sep never heard back contacted 4 times.

## 2023-09-11 ENCOUNTER — PATIENT MESSAGE (OUTPATIENT)
Dept: SURGERY | Facility: CLINIC | Age: 63
End: 2023-09-11
Payer: COMMERCIAL

## 2023-09-11 ENCOUNTER — TELEPHONE (OUTPATIENT)
Dept: SURGERY | Facility: CLINIC | Age: 63
End: 2023-09-11
Payer: COMMERCIAL

## 2023-09-11 NOTE — TELEPHONE ENCOUNTER
A call was placed to this patient re: a message receive from GI to schedule this patient for a six(6) month f/u Colonoscopy. The patient verbally consented to be scheduled to have the Colonoscopy on 10/11/2023 Pending Cardiac Clearance - Anticoagulant Therapy in use.. Additionally, a message was received this patient is to use a FLEXSIGPREPINSTRUCTIONS: The patient was instructed as follows:    Your Procedure is scheduled 10/11/2023 at [facility] [address] [place] [address]. Check in at Patient Registration on the First Floor of the Hospital.    To ensure that test is accurate and complete you must follow these instructions. If you have any questionsplease call the office at [phone].    YOU WILL NEED SOMEONE TO DRIVE YOU HOME AFTER PROCEDURE DUE TO RECEVING SEDATION.    PURCHASE TWO FLEET ENEMAS FROM YOUR LOCAL PHARMACY(IN THE LAXATIVE SECTION)    THE DAY BEFORE THE TEST    Clear liquid diet [id number] the evening before the procedure.    Clear Liquids are Water, Yellow and Green Popsicles,Juice,or Gatorade,or Powerade, Chicken or Beef Broth or Coffee or Tea without milk or cream and Sprite/7 up and lemonade.Avoid all items with red,orange,purple, or blue. No milk products or smoothies.      THE DAY OF YOUR TEST      Use the first Fleet enema TWO HOURS before leaving your home.    Use the second Fleet ENEMA ONE HOUR before leaving your home.    Nothing to eat or drink after midnight. Brushing your teeth is allowed.     However, if your test is scheduled after 12:00 noon, you may have clear  liquids up until SIX hours before the exam.    Take heart, blood pressure or seizure medication the morning of the procedure.Take none of your other medications until after procedure is complete. If you use inhalers bring them with you.

## 2023-09-18 ENCOUNTER — PATIENT MESSAGE (OUTPATIENT)
Dept: PRIMARY CARE CLINIC | Facility: CLINIC | Age: 63
End: 2023-09-18
Payer: COMMERCIAL

## 2023-10-06 ENCOUNTER — DOCUMENTATION ONLY (OUTPATIENT)
Dept: SURGERY | Facility: CLINIC | Age: 63
End: 2023-10-06
Payer: COMMERCIAL

## 2023-10-06 NOTE — PROGRESS NOTES
A follow up phone call was placed to Dr Mynor Gross's re: to a cardiac clearance request on this patient to proceed with the scheduled Sigmoidoscopy. I spoke with Ines, who informed me that Dr Mynor Gross will be addressing the Cardiac Clearance on this patient today. I will continue f/u with the Cardiac Clearance request until a conclusion is determined.

## 2023-10-18 ENCOUNTER — PATIENT MESSAGE (OUTPATIENT)
Dept: CARDIOLOGY | Facility: CLINIC | Age: 63
End: 2023-10-18
Payer: COMMERCIAL

## 2023-10-25 ENCOUNTER — OFFICE VISIT (OUTPATIENT)
Dept: OPTOMETRY | Facility: CLINIC | Age: 63
End: 2023-10-25
Payer: COMMERCIAL

## 2023-10-25 DIAGNOSIS — H25.13 NUCLEAR SCLEROSIS, BILATERAL: ICD-10-CM

## 2023-10-25 DIAGNOSIS — Z83.518 FAMILY HISTORY OF MACULAR DEGENERATION: ICD-10-CM

## 2023-10-25 DIAGNOSIS — H52.7 REFRACTIVE ERROR: ICD-10-CM

## 2023-10-25 DIAGNOSIS — Z01.00 EXAMINATION OF EYES AND VISION: Primary | ICD-10-CM

## 2023-10-25 PROCEDURE — 92004 COMPRE OPH EXAM NEW PT 1/>: CPT | Mod: S$GLB,,, | Performed by: OPTOMETRIST

## 2023-10-25 PROCEDURE — 92015 DETERMINE REFRACTIVE STATE: CPT | Mod: S$GLB,,, | Performed by: OPTOMETRIST

## 2023-10-25 PROCEDURE — 92004 PR EYE EXAM, NEW PATIENT,COMPREHESV: ICD-10-PCS | Mod: S$GLB,,, | Performed by: OPTOMETRIST

## 2023-10-25 PROCEDURE — 99999 PR PBB SHADOW E&M-EST. PATIENT-LVL II: ICD-10-PCS | Mod: PBBFAC,,, | Performed by: OPTOMETRIST

## 2023-10-25 PROCEDURE — 92015 PR REFRACTION: ICD-10-PCS | Mod: S$GLB,,, | Performed by: OPTOMETRIST

## 2023-10-25 PROCEDURE — 99999 PR PBB SHADOW E&M-EST. PATIENT-LVL II: CPT | Mod: PBBFAC,,, | Performed by: OPTOMETRIST

## 2023-10-25 NOTE — PROGRESS NOTES
HPI     Annual Exam     Additional comments: LDE: unsure            Comments    64 YO female presents today for an annual eye exam. Patient states that   she is doing well, notes no problems or complaints. Wears OTC +2.50   readers for near.     Paternal aunt with mac degeneration     EDITA ~ 8 years ago           Last edited by Wayne Farnsworth, OD on 10/25/2023  3:29 PM.            Assessment /Plan     For exam results, see Encounter Report.    Examination of eyes and vision    Nuclear sclerosis, bilateral    Refractive error    Family history of macular degeneration      1. Examination of eyes and vision    2. Nuclear sclerosis, bilateral  Mild, not visually significant.   Discussed possible ocular affects of cataracts. Acceptable BCVA OU. Discussed treatment options. Surgery not recommended at this time. Monitor yearly.     3. Refractive error  Doing well with uncorrected distance and otc readers  Dispensed updated spectacle Rx prn -- interested in rx sunglasses    4. Family history of macular degeneration  + aunt  Recommend sunglasses outdoors  No smoking, eat fish   Observe yearly

## 2024-07-10 DIAGNOSIS — Z12.31 OTHER SCREENING MAMMOGRAM: ICD-10-CM

## 2024-07-18 NOTE — TELEPHONE ENCOUNTER
----- Message from Rajni Peck sent at 7/18/2024  2:14 PM CDT -----  Contact: Patient, [phone]  Requesting an RX refill or new RX.    Is this a refill or new RX: Refill    RX name and strength (copy/paste from chart):  warfarin (COUMADIN) 5 MG tablet    Is this a 30 day or 90 day RX: 90    Pharmacy name and phone # (copy/paste from chart):    [facility]/pharmacy #[id number] - [place] - [address]  [address]  [place] [address]  Phone: [phone] Fax: [phone]       The doctors have asked that we provide their patients with the following 2 reminders -- prescription refills can take up to 72 hours, and a friendly reminder that in the future you can use your [facility] account to request refills: Yes

## 2024-07-18 NOTE — TELEPHONE ENCOUNTER
Patient requesting refill for warfarin (COUMADIN) 5 mg tablet. Last office visit 4/17/2024. Next appt 8/14/2024.

## 2024-07-19 ENCOUNTER — PATIENT MESSAGE (OUTPATIENT)
Dept: PRIMARY CARE CLINIC | Facility: CLINIC | Age: 64
End: 2024-07-19
Payer: COMMERCIAL

## 2024-07-22 ENCOUNTER — TELEPHONE (OUTPATIENT)
Dept: PRIMARY CARE CLINIC | Facility: CLINIC | Age: 64
End: 2024-07-22
Payer: COMMERCIAL

## 2024-07-22 RX ORDER — WARFARIN SODIUM 5 MG/1
5 TABLET ORAL DAILY
Qty: 30 TABLET | Refills: 2 | Status: SHIPPED | OUTPATIENT
Start: 2024-07-22

## 2024-07-22 RX ORDER — WARFARIN SODIUM 5 MG/1
5 TABLET ORAL DAILY
OUTPATIENT
Start: 2024-07-22

## 2024-07-22 NOTE — TELEPHONE ENCOUNTER
Called and spoke with patient in regards to her medication. Patient was informed that her message was sent to Dr. Geller and we are awaiting his response. Patient verbalized understanding.

## 2024-07-22 NOTE — TELEPHONE ENCOUNTER
Patient is requesting a refill on the medication Coumadin 5 mg tablets. Patient said she spoke with you about the medication already. Please see pending refill request.

## 2024-07-22 NOTE — TELEPHONE ENCOUNTER
----- Message from Evelyn Tang sent at 7/22/2024 10:39 AM CDT -----  Contact: [phone]  Patient called, following up on early message. Patient stated that no one one has reach back to her since last week. Thank you.

## 2024-07-23 NOTE — TELEPHONE ENCOUNTER
Called and spoke with patient in regards to the message below from Dr. Geller.     Call tell pt on coumadin needs PT/INR monthly--also needs be seen yearly Last seen April 2023 Needs come in get seen if needed get lab get refills  No more refills until seen     Patient verbalized understanding.

## 2024-08-02 ENCOUNTER — HOSPITAL ENCOUNTER (OUTPATIENT)
Dept: RADIOLOGY | Facility: HOSPITAL | Age: 64
Discharge: HOME | End: 2024-08-02
Attending: FAMILY MEDICINE
Payer: COMMERCIAL

## 2024-08-02 DIAGNOSIS — Z12.31 OTHER SCREENING MAMMOGRAM: ICD-10-CM

## 2024-08-02 PROCEDURE — 77067 SCR MAMMO BI INCL CAD: CPT | Mod: TC

## 2024-08-06 ENCOUNTER — OFFICE VISIT (OUTPATIENT)
Dept: CARDIOLOGY | Facility: CLINIC | Age: 64
End: 2024-08-06
Payer: COMMERCIAL

## 2024-08-06 VITALS
HEART RATE: 84 BPM | OXYGEN SATURATION: 96 % | WEIGHT: 137.56 LBS | DIASTOLIC BLOOD PRESSURE: 68 MMHG | SYSTOLIC BLOOD PRESSURE: 102 MMHG | BODY MASS INDEX: 25.16 KG/M2

## 2024-08-06 DIAGNOSIS — Z29.89 INDICATION PRESENT FOR ENDOCARDITIS PROPHYLAXIS: ICD-10-CM

## 2024-08-06 DIAGNOSIS — Z95.2 HISTORY OF MITRAL VALVE REPLACEMENT: ICD-10-CM

## 2024-08-06 DIAGNOSIS — Z79.01 ANTICOAGULANT LONG-TERM USE: ICD-10-CM

## 2024-08-06 DIAGNOSIS — E78.00 PURE HYPERCHOLESTEROLEMIA: Primary | ICD-10-CM

## 2024-08-06 PROCEDURE — 99204 OFFICE O/P NEW MOD 45 MIN: CPT | Mod: S$GLB,,, | Performed by: INTERNAL MEDICINE

## 2024-08-06 PROCEDURE — 3074F SYST BP LT 130 MM HG: CPT | Mod: CPTII,S$GLB,, | Performed by: INTERNAL MEDICINE

## 2024-08-06 PROCEDURE — 3008F BODY MASS INDEX DOCD: CPT | Mod: CPTII,S$GLB,, | Performed by: INTERNAL MEDICINE

## 2024-08-06 PROCEDURE — 1159F MED LIST DOCD IN RCRD: CPT | Mod: CPTII,S$GLB,, | Performed by: INTERNAL MEDICINE

## 2024-08-06 PROCEDURE — 1126F AMNT PAIN NOTED NONE PRSNT: CPT | Mod: CPTII,S$GLB,, | Performed by: INTERNAL MEDICINE

## 2024-08-06 PROCEDURE — 99999 PR PBB SHADOW E&M-EST. PATIENT-LVL III: CPT | Mod: PBBFAC,,, | Performed by: INTERNAL MEDICINE

## 2024-08-06 PROCEDURE — 3078F DIAST BP <80 MM HG: CPT | Mod: CPTII,S$GLB,, | Performed by: INTERNAL MEDICINE

## 2024-08-14 ENCOUNTER — OFFICE VISIT (OUTPATIENT)
Dept: PRIMARY CARE CLINIC | Facility: CLINIC | Age: 64
End: 2024-08-14
Payer: COMMERCIAL

## 2024-08-14 VITALS
OXYGEN SATURATION: 98 % | HEIGHT: 62 IN | DIASTOLIC BLOOD PRESSURE: 80 MMHG | SYSTOLIC BLOOD PRESSURE: 126 MMHG | WEIGHT: 136.81 LBS | BODY MASS INDEX: 25.18 KG/M2 | RESPIRATION RATE: 18 BRPM | HEART RATE: 68 BPM

## 2024-08-14 DIAGNOSIS — Z23 NEED FOR SHINGLES VACCINE: ICD-10-CM

## 2024-08-14 DIAGNOSIS — Z95.2 HISTORY OF MITRAL VALVE REPLACEMENT: ICD-10-CM

## 2024-08-14 DIAGNOSIS — Z79.01 ANTICOAGULANT LONG-TERM USE: ICD-10-CM

## 2024-08-14 DIAGNOSIS — Z13.1 SCREENING FOR DIABETES MELLITUS: ICD-10-CM

## 2024-08-14 DIAGNOSIS — E78.00 PURE HYPERCHOLESTEROLEMIA: Primary | ICD-10-CM

## 2024-08-14 DIAGNOSIS — Z85.828 HISTORY OF SKIN CANCER: ICD-10-CM

## 2024-08-14 PROCEDURE — 99213 OFFICE O/P EST LOW 20 MIN: CPT | Mod: S$GLB,,, | Performed by: FAMILY MEDICINE

## 2024-08-14 PROCEDURE — 3008F BODY MASS INDEX DOCD: CPT | Mod: CPTII,S$GLB,, | Performed by: FAMILY MEDICINE

## 2024-08-14 PROCEDURE — 3288F FALL RISK ASSESSMENT DOCD: CPT | Mod: CPTII,S$GLB,, | Performed by: FAMILY MEDICINE

## 2024-08-14 PROCEDURE — 3079F DIAST BP 80-89 MM HG: CPT | Mod: CPTII,S$GLB,, | Performed by: FAMILY MEDICINE

## 2024-08-14 PROCEDURE — 99999 PR PBB SHADOW E&M-EST. PATIENT-LVL III: CPT | Mod: PBBFAC,,, | Performed by: FAMILY MEDICINE

## 2024-08-14 PROCEDURE — 3074F SYST BP LT 130 MM HG: CPT | Mod: CPTII,S$GLB,, | Performed by: FAMILY MEDICINE

## 2024-08-14 PROCEDURE — 1126F AMNT PAIN NOTED NONE PRSNT: CPT | Mod: CPTII,S$GLB,, | Performed by: FAMILY MEDICINE

## 2024-08-14 PROCEDURE — 1101F PT FALLS ASSESS-DOCD LE1/YR: CPT | Mod: CPTII,S$GLB,, | Performed by: FAMILY MEDICINE

## 2024-08-14 PROCEDURE — 1159F MED LIST DOCD IN RCRD: CPT | Mod: CPTII,S$GLB,, | Performed by: FAMILY MEDICINE

## 2024-08-14 RX ORDER — ZOSTER VACCINE RECOMBINANT, ADJUVANTED 50 MCG/0.5
0.5 KIT INTRAMUSCULAR ONCE
Qty: 1 EACH | Refills: 0 | Status: SHIPPED | OUTPATIENT
Start: 2024-08-14 | End: 2024-08-14

## 2024-08-14 NOTE — PROGRESS NOTES
Subjective:       Patient ID: Abby Nguyen is a 64 y.o. female.    Chief Complaint: Annual Exam    HPI: 63 yo WF in for wellness visit--eating well--+BM---ambulating well  History colonic polyps---removed during colonoscopy--no more bleeding per rectum  Osteoporosis on Fosamax  Hyperlipidemia on Crestor    ROS:  Skin: no psoriasis, eczema,+ skin cancer left foot S squamous cell--several lesions removed from the right forearm--appt Dr Tommy Turner   HEENT: no   headache, ocular pain, blurred vision, diplopia, epistaxis, hoarseness change in voice, thyroid trouble  Lung:Hx walking pneumonia --in Dec ,no  asthma, Tb, wheezing, SOB, no smoking  Heart: No chest pain, ankle edema, palpitations, MI, oni murmur, hypertension, +hyperlipidemia--no stent bypass arrhythmia--history mitral valve replacement patient on Coumadin 1997  njk  Abdomen: No nausea, vomiting, diarrhea, constipation, ulcers, hepatitis, gallbladder disease, melena, hematochezia, hematemesis--history bright red blood per rectum see history of present illness  : no UTI, renal disease, stones  GYN hyst just had mammogram  MS: no fractures, O/A, lupus, rheumatoid, gout  Neuro: No dizziness, LOC, seizures   No diabetes, no anemia, no anxiety, no depression    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 Pure hypercholesterolemia    2. Screening for diabetes mellitus    3. Need for shingles  vaccine    4. History of skin cancer    5. History of mitral valve replacement    6. Anticoagulant long-term use        Plan:       Pure hypercholesterolemia  -     CBC Auto Differential; Future; Expected date: 08/14/2024  -     Comprehensive Metabolic Panel; Future; Expected date: 08/14/2024  -     Lipid Panel; Future; Expected date: 08/14/2024  -     T4, Free; Future; Expected date: 08/14/2024  -     TSH; Future; Expected date: 08/14/2024  -     Hemoglobin A1C; Future; Expected date: 08/14/2024    Screening for diabetes mellitus    Need for shingles vaccine  -     varicella-zoster [id number], PF, (SHINGRIX, PF,) 50 mcg/0.5 mL injection; Inject 0.5 mLs into the muscle once. for 1 dose  Dispense: 1 each; Refill: 0    History of skin cancer    History of mitral valve replacement    Anticoagulant long-term use        Main reason visit   Wellness visit  Hyperlipidemia on Crestor  Mitral valve replacement on Coumadin--has PT INR machine checks INR levels weekly  History mitral valve replacement--to see new cardiologist Dr Early  Lab CBCs CMP lipids TSH T4 --when fasting

## 2024-10-03 ENCOUNTER — OFFICE VISIT (OUTPATIENT)
Dept: PODIATRY | Facility: CLINIC | Age: 64
End: 2024-10-03
Payer: COMMERCIAL

## 2024-10-03 VITALS
BODY MASS INDEX: 24.33 KG/M2 | SYSTOLIC BLOOD PRESSURE: 116 MMHG | HEART RATE: 81 BPM | DIASTOLIC BLOOD PRESSURE: 63 MMHG | WEIGHT: 133.06 LBS

## 2024-10-03 DIAGNOSIS — M79.671 PAIN IN RIGHT FOOT: Primary | ICD-10-CM

## 2024-10-03 DIAGNOSIS — B07.0 PLANTAR WART, RIGHT FOOT: ICD-10-CM

## 2024-10-03 DIAGNOSIS — Z95.2 HISTORY OF MITRAL VALVE REPLACEMENT: ICD-10-CM

## 2024-10-03 DIAGNOSIS — Q82.8 POROKERATOSIS: ICD-10-CM

## 2024-10-03 DIAGNOSIS — L84 PLANTAR CALLUS: ICD-10-CM

## 2024-10-03 DIAGNOSIS — Z79.01 ANTICOAGULANT LONG-TERM USE: ICD-10-CM

## 2024-10-03 PROCEDURE — 17110 DESTRUCTION B9 LES UP TO 14: CPT | Mod: S$GLB,,, | Performed by: PODIATRIST

## 2024-10-03 PROCEDURE — 3074F SYST BP LT 130 MM HG: CPT | Mod: CPTII,S$GLB,, | Performed by: PODIATRIST

## 2024-10-03 PROCEDURE — 1159F MED LIST DOCD IN RCRD: CPT | Mod: CPTII,S$GLB,, | Performed by: PODIATRIST

## 2024-10-03 PROCEDURE — 99999 PR PBB SHADOW E&M-EST. PATIENT-LVL III: CPT | Mod: PBBFAC,,, | Performed by: PODIATRIST

## 2024-10-03 PROCEDURE — 1125F AMNT PAIN NOTED PAIN PRSNT: CPT | Mod: CPTII,S$GLB,, | Performed by: PODIATRIST

## 2024-10-03 PROCEDURE — 99203 OFFICE O/P NEW LOW 30 MIN: CPT | Mod: 25,S$GLB,, | Performed by: PODIATRIST

## 2024-10-03 PROCEDURE — 3078F DIAST BP <80 MM HG: CPT | Mod: CPTII,S$GLB,, | Performed by: PODIATRIST

## 2024-10-03 PROCEDURE — 3008F BODY MASS INDEX DOCD: CPT | Mod: CPTII,S$GLB,, | Performed by: PODIATRIST

## 2024-10-03 PROCEDURE — 3044F HG A1C LEVEL LT 7.0%: CPT | Mod: CPTII,S$GLB,, | Performed by: PODIATRIST

## 2024-10-03 NOTE — PROGRESS NOTES
Subjective:      Patient ID: Abby Nguyen is a 64 y.o. female.    Chief Complaint: Foot Problem and Callouses (R side)    Abby is a 64 y.o. female who presents new to the podiatry clinic  w/ c/o R foot pain. Onset of the symptoms was several months ago, 6 months, when callus started. Precipitating event: none known. Current symptoms include: ability to bear weight, but with some pain. Sharp burning pain not confined to WB; h/o shaved bone 2013.    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   Skin:  Positive for dry skin and suspicious lesions. Negative for color change.   Musculoskeletal:  Negative for falls, joint pain and myalgias.   Psychiatric/Behavioral:  The patient is not nervous/anxious.      Physical Exam  Vitals reviewed.   Constitutional:       General: She is not in acute distress.     Appearance: She is well-developed and normal weight.   Cardiovascular:      Pulses: Normal pulses.           Dorsalis pedis pulses are 2+ on the right side.   Musculoskeletal:         General: Tenderness present. No swelling or signs of injury.        Feet:    Feet:      Right foot:      Skin integrity: Callus present.   Skin:     General: Skin is warm and dry.      Capillary  Refill: Capillary refill takes less than 2 seconds.      Findings: Lesion present. No bruising or erythema.      Comments: Hyperkeratosis w/ central clearing/ lateralization of skin lines x 2 adjacent lesions plantar lateral 5th met.head R foot   Neurological:      Mental Status: She is alert and oriented to person, place, and time.      Motor: Motor function is intact. No weakness.      Gait: Gait is intact. Gait normal.   Psychiatric:         Mood and Affect: Mood and affect normal.         Behavior: Behavior normal. Behavior is cooperative.         Assessment:      Encounter Diagnoses   Name Primary?    Anticoagulant long-term use     History of mitral valve replacement     Plantar callus     Pain in right foot Yes    Plantar wart, right foot     Porokeratosis        Problem List Items Addressed This Visit          Cardiac/Vascular    History of mitral valve replacement       Hematology    Anticoagulant long-term use     Other Visit Diagnoses       Pain in right foot    -  Primary    Plantar callus        Plantar wart, right foot        Relevant Orders    Destruction of Benign Lesion/ porokeratoses v warts sub 5th met.R    Porokeratosis        Relevant Orders    Destruction of Benign Lesion/ porokeratoses v warts sub 5th met.R          Plan:       Abby was seen today for foot problem and callouses.    Diagnoses and all orders for this visit:    Pain in right foot    Anticoagulant long-term use    History of mitral valve replacement    Plantar callus    Plantar wart, right foot  -     Destruction of Benign Lesion/ porokeratoses v warts sub 5th met.R    Porokeratosis  -     Destruction of Benign Lesion/ porokeratoses v warts sub 5th met.R    I counseled the patient on her conditions, their implications & medical mgmt.    Patient education regarding warts v porokeratoses was given  Hyperkeratotic tissue was debrided & enucleated, a single 5 second application of liquid Trichloracetic acid was applied followed by  Salinocaine under occlusion, to be left in place for 2 days as tolerated.   Verbal patient instruction given.  Follow up 2-3 wks.prn for serial retx prn         A total of 29 mins.was spent on chart review, patient visit & documentation.

## 2024-10-14 NOTE — PROCEDURES
Destruction of Benign Lesion/ porokeratoses v warts sub 5th met.R    Date/Time: 10/3/2024 11:00 AM    Performed by: Evelyn Epstein DPM  Authorized by: Evelyn Epstein DPM    Procedure Details:     Cosmetic?: No      Number of lesions:  2    Destruction method:  Other    Bleeding:  None     Patient tolerated the procedure well with no immediate complications.   Sharp debridement & enucleation of hyperkeratotic lesion w/ sterile disposable blade. Chemical cautery applied including Trichloracetic acid & Salinocaine under occlusion.

## 2024-10-21 ENCOUNTER — OFFICE VISIT (OUTPATIENT)
Dept: PODIATRY | Facility: CLINIC | Age: 64
End: 2024-10-21
Payer: COMMERCIAL

## 2024-10-21 VITALS
HEIGHT: 62 IN | SYSTOLIC BLOOD PRESSURE: 120 MMHG | WEIGHT: 135.38 LBS | HEART RATE: 64 BPM | DIASTOLIC BLOOD PRESSURE: 66 MMHG | BODY MASS INDEX: 24.91 KG/M2

## 2024-10-21 DIAGNOSIS — B07.0 PLANTAR WART, RIGHT FOOT: Primary | ICD-10-CM

## 2024-10-21 DIAGNOSIS — Q82.8 POROKERATOSIS: ICD-10-CM

## 2024-10-21 DIAGNOSIS — Z95.2 HISTORY OF MITRAL VALVE REPLACEMENT: ICD-10-CM

## 2024-10-21 DIAGNOSIS — Z79.01 ANTICOAGULANT LONG-TERM USE: ICD-10-CM

## 2024-10-21 PROCEDURE — 17110 DESTRUCTION B9 LES UP TO 14: CPT | Mod: S$GLB,,, | Performed by: PODIATRIST

## 2024-10-21 PROCEDURE — 99213 OFFICE O/P EST LOW 20 MIN: CPT | Mod: 25,S$GLB,, | Performed by: PODIATRIST

## 2024-10-21 PROCEDURE — 3288F FALL RISK ASSESSMENT DOCD: CPT | Mod: CPTII,S$GLB,, | Performed by: PODIATRIST

## 2024-10-21 PROCEDURE — 3074F SYST BP LT 130 MM HG: CPT | Mod: CPTII,S$GLB,, | Performed by: PODIATRIST

## 2024-10-21 PROCEDURE — 3044F HG A1C LEVEL LT 7.0%: CPT | Mod: CPTII,S$GLB,, | Performed by: PODIATRIST

## 2024-10-21 PROCEDURE — 1125F AMNT PAIN NOTED PAIN PRSNT: CPT | Mod: CPTII,S$GLB,, | Performed by: PODIATRIST

## 2024-10-21 PROCEDURE — 1101F PT FALLS ASSESS-DOCD LE1/YR: CPT | Mod: CPTII,S$GLB,, | Performed by: PODIATRIST

## 2024-10-21 PROCEDURE — 3078F DIAST BP <80 MM HG: CPT | Mod: CPTII,S$GLB,, | Performed by: PODIATRIST

## 2024-10-21 PROCEDURE — 3008F BODY MASS INDEX DOCD: CPT | Mod: CPTII,S$GLB,, | Performed by: PODIATRIST

## 2024-10-21 PROCEDURE — 99999 PR PBB SHADOW E&M-EST. PATIENT-LVL III: CPT | Mod: PBBFAC,,, | Performed by: PODIATRIST

## 2024-10-28 ENCOUNTER — OFFICE VISIT (OUTPATIENT)
Dept: DERMATOLOGY | Facility: CLINIC | Age: 64
End: 2024-10-28
Payer: COMMERCIAL

## 2024-10-28 VITALS — BODY MASS INDEX: 24.91 KG/M2 | WEIGHT: 135.38 LBS | HEIGHT: 62 IN

## 2024-10-28 DIAGNOSIS — L57.0 ACTINIC KERATOSES: Primary | ICD-10-CM

## 2024-10-28 DIAGNOSIS — D22.9 MULTIPLE BENIGN NEVI: ICD-10-CM

## 2024-10-28 DIAGNOSIS — Z08 ENCOUNTER FOR FOLLOW-UP SURVEILLANCE OF SKIN CANCER: ICD-10-CM

## 2024-10-28 DIAGNOSIS — L81.4 SOLAR LENTIGO: ICD-10-CM

## 2024-10-28 DIAGNOSIS — D18.01 CHERRY ANGIOMA: ICD-10-CM

## 2024-10-28 DIAGNOSIS — Z85.828 ENCOUNTER FOR FOLLOW-UP SURVEILLANCE OF SKIN CANCER: ICD-10-CM

## 2024-10-28 DIAGNOSIS — L57.8 OTHER SKIN CHANGES DUE TO CHRONIC EXPOSURE TO NONIONIZING RADIATION: ICD-10-CM

## 2024-10-28 PROCEDURE — 99213 OFFICE O/P EST LOW 20 MIN: CPT | Mod: 25,S$GLB,, | Performed by: DERMATOLOGY

## 2024-10-28 PROCEDURE — 3044F HG A1C LEVEL LT 7.0%: CPT | Mod: CPTII,S$GLB,, | Performed by: DERMATOLOGY

## 2024-10-28 PROCEDURE — 1101F PT FALLS ASSESS-DOCD LE1/YR: CPT | Mod: CPTII,S$GLB,, | Performed by: DERMATOLOGY

## 2024-10-28 PROCEDURE — 17000 DESTRUCT PREMALG LESION: CPT | Mod: S$GLB,,, | Performed by: DERMATOLOGY

## 2024-10-28 PROCEDURE — 3288F FALL RISK ASSESSMENT DOCD: CPT | Mod: CPTII,S$GLB,, | Performed by: DERMATOLOGY

## 2024-10-28 PROCEDURE — 1159F MED LIST DOCD IN RCRD: CPT | Mod: CPTII,S$GLB,, | Performed by: DERMATOLOGY

## 2024-10-28 PROCEDURE — 1160F RVW MEDS BY RX/DR IN RCRD: CPT | Mod: CPTII,S$GLB,, | Performed by: DERMATOLOGY

## 2024-10-28 PROCEDURE — 3008F BODY MASS INDEX DOCD: CPT | Mod: CPTII,S$GLB,, | Performed by: DERMATOLOGY

## 2024-10-28 PROCEDURE — 17003 DESTRUCT PREMALG LES 2-14: CPT | Mod: S$GLB,,, | Performed by: DERMATOLOGY

## 2024-12-16 RX ORDER — WARFARIN SODIUM 5 MG/1
5 TABLET ORAL DAILY
Qty: 90 TABLET | Refills: 3 | Status: SHIPPED | OUTPATIENT
Start: 2024-12-16

## 2025-01-13 ENCOUNTER — OFFICE VISIT (OUTPATIENT)
Dept: PRIMARY CARE CLINIC | Facility: CLINIC | Age: 65
End: 2025-01-13
Payer: COMMERCIAL

## 2025-01-13 VITALS
TEMPERATURE: 98 F | SYSTOLIC BLOOD PRESSURE: 118 MMHG | RESPIRATION RATE: 16 BRPM | DIASTOLIC BLOOD PRESSURE: 66 MMHG | HEIGHT: 62 IN | WEIGHT: 134.5 LBS | HEART RATE: 67 BPM | BODY MASS INDEX: 24.75 KG/M2

## 2025-01-13 DIAGNOSIS — J40 BRONCHITIS: ICD-10-CM

## 2025-01-13 DIAGNOSIS — J01.00 ACUTE NON-RECURRENT MAXILLARY SINUSITIS: Primary | ICD-10-CM

## 2025-01-13 DIAGNOSIS — Z95.2 HISTORY OF MITRAL VALVE REPLACEMENT: ICD-10-CM

## 2025-01-13 DIAGNOSIS — E78.00 PURE HYPERCHOLESTEROLEMIA: ICD-10-CM

## 2025-01-13 LAB
CTP QC/QA: YES
SARS-COV-2 RDRP RESP QL NAA+PROBE: NEGATIVE

## 2025-01-13 PROCEDURE — 96372 THER/PROPH/DIAG INJ SC/IM: CPT | Mod: S$GLB,,, | Performed by: FAMILY MEDICINE

## 2025-01-13 PROCEDURE — 87635 SARS-COV-2 COVID-19 AMP PRB: CPT | Mod: QW,S$GLB,, | Performed by: FAMILY MEDICINE

## 2025-01-13 PROCEDURE — 1159F MED LIST DOCD IN RCRD: CPT | Mod: CPTII,S$GLB,, | Performed by: FAMILY MEDICINE

## 2025-01-13 PROCEDURE — 3074F SYST BP LT 130 MM HG: CPT | Mod: CPTII,S$GLB,, | Performed by: FAMILY MEDICINE

## 2025-01-13 PROCEDURE — 3078F DIAST BP <80 MM HG: CPT | Mod: CPTII,S$GLB,, | Performed by: FAMILY MEDICINE

## 2025-01-13 PROCEDURE — 99999 PR PBB SHADOW E&M-EST. PATIENT-LVL IV: CPT | Mod: PBBFAC,,, | Performed by: FAMILY MEDICINE

## 2025-01-13 PROCEDURE — 3008F BODY MASS INDEX DOCD: CPT | Mod: CPTII,S$GLB,, | Performed by: FAMILY MEDICINE

## 2025-01-13 PROCEDURE — 1126F AMNT PAIN NOTED NONE PRSNT: CPT | Mod: CPTII,S$GLB,, | Performed by: FAMILY MEDICINE

## 2025-01-13 PROCEDURE — 99213 OFFICE O/P EST LOW 20 MIN: CPT | Mod: 25,S$GLB,, | Performed by: FAMILY MEDICINE

## 2025-01-13 PROCEDURE — 3288F FALL RISK ASSESSMENT DOCD: CPT | Mod: CPTII,S$GLB,, | Performed by: FAMILY MEDICINE

## 2025-01-13 PROCEDURE — 1101F PT FALLS ASSESS-DOCD LE1/YR: CPT | Mod: CPTII,S$GLB,, | Performed by: FAMILY MEDICINE

## 2025-01-13 RX ORDER — PREDNISONE 20 MG/1
20 TABLET ORAL DAILY
Qty: 7 TABLET | Refills: 0 | Status: SHIPPED | OUTPATIENT
Start: 2025-01-13 | End: 2025-01-20

## 2025-01-13 RX ORDER — ALBUTEROL SULFATE 90 UG/1
2 INHALANT RESPIRATORY (INHALATION) EVERY 4 HOURS PRN
Qty: 18 G | Refills: 5 | Status: SHIPPED | OUTPATIENT
Start: 2025-01-13

## 2025-01-13 RX ORDER — CODEINE PHOSPHATE AND GUAIFENESIN 10; 100 MG/5ML; MG/5ML
SOLUTION ORAL
Qty: 180 ML | Refills: 0 | Status: SHIPPED | OUTPATIENT
Start: 2025-01-13 | End: 2025-01-14 | Stop reason: SDUPTHER

## 2025-01-13 RX ORDER — TRIAMCINOLONE ACETONIDE 40 MG/ML
40 INJECTION, SUSPENSION INTRA-ARTICULAR; INTRAMUSCULAR ONCE
Status: COMPLETED | OUTPATIENT
Start: 2025-01-13 | End: 2025-01-13

## 2025-01-13 RX ORDER — CEFDINIR 300 MG/1
300 CAPSULE ORAL 2 TIMES DAILY
Qty: 20 CAPSULE | Refills: 0 | Status: SHIPPED | OUTPATIENT
Start: 2025-01-13 | End: 2025-01-23

## 2025-01-13 RX ADMIN — TRIAMCINOLONE ACETONIDE 40 MG: 40 INJECTION, SUSPENSION INTRA-ARTICULAR; INTRAMUSCULAR at 02:01

## 2025-01-13 NOTE — PATIENT INSTRUCTIONS
Sinusitis bronchitis bronchospasm  Kenalog 40 mg IM new  COVID swab  Omnicef 300 mg 1 p.o. b.i.d. times 10 days  Start Tuesday a.m. prednisone 20 mg 1 p.o. q.d. x7 days  Albuterol inhaler 2 puffs q.6h p.r.n. shortness breath wheezing or cough  Tussi-Organidin 1 tsp q.6h p.r.n. cough--if needed can call for Tessalon Perles  Chest x-ray--due to bronchospasm wheezing and rhonchi   no

## 2025-01-13 NOTE — PROGRESS NOTES
Subjective:       Patient ID: Abby Nguyen is a 64 y.o. female.    Chief Complaint: Chills and Cough    HPI:  64-year-old white female in for cough congestion stuffy runny nose---Sick times 5-6 days---subjective fever---chills---+runny stuffy nose---no sore throat---+cough--+phlegm--pale yellow  History of walking pneumonia in the past no asthma TB no smoking---no nausea vomiting diarrhea.  Best friend had COVID--1 week  Osteoporosis on Fosamax hyperlipidemia on Crestor  History of colonic polyps  History mitral valve replacement-doing well  sees Dr Early      ROS:  Skin: no psoriasis, eczema,+ skin cancer left foot S squamous cell--several lesions removed from the right forearm--appt Dr Tommy Turner   HEENT: no   headache, ocular pain, blurred vision, diplopia, epistaxis, hoarseness change in voice, thyroid trouble  Lung:Hx walking pneumonia --in Dec ,no  asthma, Tb, wheezing, SOB, no smoking  Heart: No chest pain, ankle edema, palpitations, MI, oni murmur, hypertension, +hyperlipidemia--no stent bypass arrhythmia--history mitral valve replacement patient on Coumadin 1997  njk  Abdomen: No nausea, vomiting, diarrhea, constipation, ulcers, hepatitis, gallbladder disease, melena, hematochezia, hematemesis--history bright red blood per rectum see history of present illness  : no UTI, renal disease, stones  GYN hyst just had mammogram  MS: no fractures, O/A, lupus, rheumatoid, gout  Neuro: No dizziness, LOC, seizures   No diabetes, no anemia, no anxiety, no depression    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 Acute non-recurrent maxillary sinusitis    2. Bronchitis    3. History of mitral valve replacement    4. Pure hypercholesterolemia          Plan:       Acute non-recurrent maxillary sinusitis    Bronchitis    History of mitral valve replacement    Pure hypercholesterolemia          Main reason visit   Upper respiratory infection---sinusitis/bronchitis/COVID exposure--COVID swab--Kenalog 40 mg-Omnicef 300 mg 1 p.o. b.i.d.-prednisone 20 mg 1 p.o. q.d. x7 days--Tussi-Organidin 1 tsp q.6h p.r.n. cough--albuterol inhaler 2 puffs q.6h p.r.n. shortness breath cough or wheezing--chest x-ray  Other medical issues  Hyperlipidemia on Crestor  Mitral valve replacement on Coumadin--has PT INR machine checks INR levels weekly  History mitral valve replacement--to see new cardiologist Dr Early  Lab CBCs CMP lipids in 6 mo

## 2025-01-14 ENCOUNTER — PATIENT MESSAGE (OUTPATIENT)
Dept: PRIMARY CARE CLINIC | Facility: CLINIC | Age: 65
End: 2025-01-14
Payer: COMMERCIAL

## 2025-01-14 DIAGNOSIS — J40 BRONCHITIS: ICD-10-CM

## 2025-01-14 RX ORDER — CODEINE PHOSPHATE AND GUAIFENESIN 10; 100 MG/5ML; MG/5ML
SOLUTION ORAL
Qty: 180 ML | Refills: 0 | OUTPATIENT
Start: 2025-01-14

## 2025-01-14 NOTE — TELEPHONE ENCOUNTER
Spoke with patient who said the pharmacy is out of guaiFENesin-codeine 100-10 mg/5 ml (TUSSI-ORGANIDIN NR)  mg/5 mL syrup, please sent to [facility]

## 2025-01-14 NOTE — TELEPHONE ENCOUNTER
----- Message from Ashlyn sent at 1/14/2025 11:39 AM CST -----  Contact: [phone]  [email or web address]     Patient is calling for Medical Advice regarding: pt pharmacy is out of guaiFENesin-codeine 100-10 mg/5 ml (TUSSI-ORGANIDIN NR)  mg/5 mL syrup, please sent to [facility]    Pharm[address] name and phone#:    [address] [place] [address] [facility]  [address] [facility] [address]  Phone: [phone] Fax: [phone]    Patient wants a call back or thru [facility]: call    Comments:    Please advise patient replies from provider may take up to 48 hours.

## 2025-01-15 RX ORDER — CODEINE PHOSPHATE AND GUAIFENESIN 10; 100 MG/5ML; MG/5ML
SOLUTION ORAL
Qty: 180 ML | Refills: 0 | Status: SHIPPED | OUTPATIENT
Start: 2025-01-15

## 2025-01-27 ENCOUNTER — PATIENT MESSAGE (OUTPATIENT)
Dept: CARDIOLOGY | Facility: CLINIC | Age: 65
End: 2025-01-27
Payer: COMMERCIAL

## 2025-02-11 RX ORDER — ALENDRONATE SODIUM 70 MG/1
TABLET ORAL
Qty: 12 TABLET | Refills: 3 | Status: SHIPPED | OUTPATIENT
Start: 2025-02-11

## 2025-02-11 NOTE — TELEPHONE ENCOUNTER
Care Due:                  Date            Visit Type   Department     Provider  --------------------------------------------------------------------------------                                MYCHART                              FOLLOWUP/OF  [facility]  Last Visit: 01-      FICE VISIT   PRIMARY CARE   Len Geller  Next Visit: None Scheduled  None         None Found                                                            Last  Test          Frequency    Reason                     Performed    Due Date  --------------------------------------------------------------------------------    Mg Level....  12 months..  alendronate..............  Not Found    Overdue    Phosphate...  12 months..  alendronate..............  Not Found    Overdue    [employer] Embedded Care Due Messages. Reference number: [id number].   2/11/2025 3:50:35 PM CST

## 2025-02-11 NOTE — TELEPHONE ENCOUNTER
Refill Routing Note   Medication(s) are not appropriate for processing by [facility] for the following reason(s):      Medication outside of protocol    [facility] action(s):  Route Care Due:  Labs due            Appointments  past 12m or future 3m with PCP    Date Provider   Last Visit   Visit date not found Harris Asif MD   Next Visit   Visit date not found Harris Asif MD   ED visits in past 90 days: 0        Note composed:5:28 PM 02/11/2025

## 2025-02-12 ENCOUNTER — OFFICE VISIT (OUTPATIENT)
Dept: CARDIOLOGY | Facility: CLINIC | Age: 65
End: 2025-02-12
Payer: COMMERCIAL

## 2025-02-12 VITALS
BODY MASS INDEX: 24.35 KG/M2 | DIASTOLIC BLOOD PRESSURE: 70 MMHG | SYSTOLIC BLOOD PRESSURE: 124 MMHG | HEART RATE: 64 BPM | WEIGHT: 133.19 LBS

## 2025-02-12 DIAGNOSIS — E78.00 PURE HYPERCHOLESTEROLEMIA: ICD-10-CM

## 2025-02-12 DIAGNOSIS — Z29.89 INDICATION PRESENT FOR ENDOCARDITIS PROPHYLAXIS: ICD-10-CM

## 2025-02-12 DIAGNOSIS — Z95.2 HISTORY OF MITRAL VALVE REPLACEMENT: Primary | ICD-10-CM

## 2025-02-12 DIAGNOSIS — Z79.01 ANTICOAGULANT LONG-TERM USE: ICD-10-CM

## 2025-02-12 PROCEDURE — 3074F SYST BP LT 130 MM HG: CPT | Mod: CPTII,S$GLB,, | Performed by: INTERNAL MEDICINE

## 2025-02-12 PROCEDURE — 1101F PT FALLS ASSESS-DOCD LE1/YR: CPT | Mod: CPTII,S$GLB,, | Performed by: INTERNAL MEDICINE

## 2025-02-12 PROCEDURE — 1159F MED LIST DOCD IN RCRD: CPT | Mod: CPTII,S$GLB,, | Performed by: INTERNAL MEDICINE

## 2025-02-12 PROCEDURE — 99999 PR PBB SHADOW E&M-EST. PATIENT-LVL III: CPT | Mod: PBBFAC,,, | Performed by: INTERNAL MEDICINE

## 2025-02-12 PROCEDURE — 3008F BODY MASS INDEX DOCD: CPT | Mod: CPTII,S$GLB,, | Performed by: INTERNAL MEDICINE

## 2025-02-12 PROCEDURE — 3288F FALL RISK ASSESSMENT DOCD: CPT | Mod: CPTII,S$GLB,, | Performed by: INTERNAL MEDICINE

## 2025-02-12 PROCEDURE — 1126F AMNT PAIN NOTED NONE PRSNT: CPT | Mod: CPTII,S$GLB,, | Performed by: INTERNAL MEDICINE

## 2025-02-12 PROCEDURE — 99214 OFFICE O/P EST MOD 30 MIN: CPT | Mod: S$GLB,,, | Performed by: INTERNAL MEDICINE

## 2025-02-12 PROCEDURE — 3078F DIAST BP <80 MM HG: CPT | Mod: CPTII,S$GLB,, | Performed by: INTERNAL MEDICINE

## 2025-02-12 NOTE — PROGRESS NOTES
Cardiology    2/12/2025  2:37 PM    Problem list  Patient Active Problem List   Diagnosis    History of mitral valve replacement    Squamous cell cancer of skin of hand, right    Nonintractable headache    History of skin cancer    Hyperlipidemia    Anticoagulant long-term use    Indication present for endocarditis prophylaxis       CC:  Follow-up    HPI:  She is here for a six-month follow-up.  She has been doing well.  She denies any chest pain or shortness breath.  She denies any fever or chills.  Her echocardiogram showed good left ventricular function and normal mechanical mitral valve.  She is compliant with the warfarin and INR check.  She denies any bleeding.    Medications  Current Outpatient Medications   Medication Sig Dispense Refill    albuterol (PROVENTIL/VENTOLIN HFA) 90 mcg/actuation inhaler Inhale 2 puffs into the lungs every 4 (four) hours as needed. Rescue 18 g 5    alendronate (FOSAMAX) 70 MG tablet 1 po q week 12 tablet 3    guaiFENesin-codeine 100-10 mg/5 ml (TUSSI-ORGANIDIN NR)  mg/5 mL syrup 1 tsp q.6h p.r.n. cough 180 mL 0    rosuvastatin (CRESTOR) 20 MG tablet Take 20 mg by mouth every other day. Pt takes it every other day      warfarin (COUMADIN) 5 MG tablet TAKE 1 TABLET (5 MG TOTAL) BY MOUTH DAILY. 90 tablet 3     No current facility-administered medications for this visit.      Prior to Admission medications    Medication Sig Start Date End Date Taking? Authorizing Provider   albuterol (PROVENTIL/VENTOLIN HFA) 90 mcg/actuation inhaler Inhale 2 puffs into the lungs every 4 (four) hours as needed. Rescue 1/13/25  Yes Len Geller MD   alendronate (FOSAMAX) 70 MG tablet 1 po q week 2/11/25  Yes Len Geller MD   guaiFENesin-codeine 100-10 mg/5 ml (TUSSI-ORGANIDIN NR)  mg/5 mL syrup 1 tsp q.6h p.r.n. cough 1/15/25  Yes Len Geller MD   rosuvastatin (CRESTOR) 20 MG tablet Take 20 mg by mouth every other day. Pt takes it every other day 3/13/18  Yes  Provider, Historical   warfarin (COUMADIN) 5 MG tablet TAKE 1 TABLET (5 MG TOTAL) BY MOUTH DAILY. 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     Squamous cell carcinoma of foot, left 02/08/2023    L medial foot     Past Surgical History:   Procedure Laterality Date    BREAST BIOPSY Right     benign    COLONOSCOPY N/A 03/29/2023    Procedure: COLONOSCOPY;  Surgeon: Supa Herrera MD;  Location: [facility] ENDO;  Service: Endoscopy;  Laterality: N/A;    FLEXIBLE SIGMOIDOSCOPY  10/11/2023    polyp x 4, clip x1    FLEXIBLE SIGMOIDOSCOPY N/A 10/11/2023    Procedure: SIGMOIDOSCOPY, FLEXIBLE;  Surgeon: Supa Herrera MD;  Location: [facility] ENDO;  Service: Endoscopy;  Laterality: N/A;    GALLBLADDER SURGERY      HYSTERECTOMY      MITRAL VALVE REPLACEMENT       Social History     Socioeconomic History    Marital status:    Tobacco Use    Smoking status: Every Day     Types: Vaping with nicotine    Smokeless tobacco: Never   Substance and Sexual Activity    Alcohol use: Yes     Comment: socially.    Drug use: No    Sexual activity: Never     Birth control/protection: See Surgical Hx     Social Drivers of Health     Financial Resource Strain: Medium Risk (2/11/2025)    Overall Financial Resource Strain (CARDIA)     Difficulty of Paying Living Expenses: Somewhat hard   Food Insecurity: No Food Insecurity (2/11/2025)    Hunger Vital Sign     Worried About Running Out of Food in the Last Year: Never true     Ran Out of Food in the Last Year: Never true   Transportation Needs: No Transportation Needs (2/11/2025)    PRAPARE - Transportation     Lack of Transportation (Medical): No     Lack of Transportation (Non-Medical): No   Physical Activity:  Insufficiently Active (2/11/2025)    Exercise Vital Sign     Days of Exercise per Week: 5 days     Minutes of Exercise per Session: 20 min   Stress: No Stress Concern Present (2/11/2025)    [employer] [place] [employer] - Occupational Stress Questionnaire     Feeling of Stress : Only a little   Housing Stability: Low Risk  (2/11/2025)    Housing Stability Vital Sign     Unable to Pay for Housing in the Last Year: No     Homeless in the Last Year: No         Allergies  Review of patient's allergies indicates:   Allergen Reactions    Dilantin [phenytoin sodium extended]     Phenergan [promethazine]          Review of Systems   Review of Systems   Constitutional: Negative for decreased appetite, fever and weight loss.   HENT:  Negative for congestion and nosebleeds.    Eyes:  Negative for double vision, vision loss in left eye, vision loss in right eye and visual disturbance.   Cardiovascular:  Negative for chest pain, claudication, cyanosis, dyspnea on exertion, irregular heartbeat, leg swelling, near-syncope, orthopnea, palpitations, paroxysmal nocturnal dyspnea and syncope.   Respiratory:  Negative for cough, hemoptysis, shortness of breath, sleep disturbances due to breathing, snoring, sputum production and wheezing.    Endocrine: Negative for cold intolerance and heat intolerance.   Skin:  Negative for nail changes and rash.   Musculoskeletal:  Negative for joint pain, muscle cramps, muscle weakness and myalgias.   Gastrointestinal:  Negative for change in bowel habit, heartburn, hematemesis, hematochezia, hemorrhoids and melena.   Neurological:  Negative for dizziness, focal weakness and headaches.         Physical Exam  Wt Readings from Last 1 Encounters:   02/12/25 60.4 kg (133 lb 2.5 oz)     BP Readings from Last 3 Encounters:   02/12/25 124/70   01/13/25 118/66   10/21/24 120/66     Pulse Readings from Last 1 Encounters:   02/12/25 64     Body mass index is 24.35 kg/m².    Physical  Exam  Constitutional:       Appearance: She is well-developed.   HENT:      Head: Atraumatic.   Eyes:      General: No scleral icterus.  Neck:      Vascular: Normal carotid pulses. No carotid bruit, hepatojugular reflux or JVD.   Cardiovascular:      Rate and Rhythm: Normal rate and regular rhythm.      Chest Wall: PMI is not displaced.      Pulses: Intact distal pulses.           Carotid pulses are 2+ on the right side and 2+ on the left side.       Radial pulses are 2+ on the right side and 2+ on the left side.        Dorsalis pedis pulses are 2+ on the right side and 2+ on the left side.      Heart sounds: Normal heart sounds, S1 normal and S2 normal. No murmur heard.     No friction rub.      Comments: Midsternal scar.  Metallic click present.  Pulmonary:      Effort: Pulmonary effort is normal. No respiratory distress.      Breath sounds: Normal breath sounds. No stridor. No wheezing or rales.   Chest:      Chest wall: No tenderness.   Abdominal:      General: Bowel sounds are normal.      Palpations: Abdomen is soft.   Musculoskeletal:      Cervical back: Neck supple. No edema.   Skin:     General: Skin is warm and dry.      Nails: There is no clubbing.   Neurological:      Mental Status: She is alert and oriented to person, place, and time.   Psychiatric:         Behavior: Behavior normal.         Thought Content: Thought content normal.             Assessment  1. History of mitral valve replacement (Primary)  Stable    2. Pure hypercholesterolemia  Stable  - Comprehensive Metabolic Panel; Future  - Lipid Panel; Future    3. Indication present for endocarditis prophylaxis  Unchanged    4. Anticoagulant long-term use  Stable        Plan and Discussion  Discussed her echocardiogram results.  Recommend to comply with rosuvastatin daily.  Discussed her last lipid profile was increased from the 1 prior.  Continue with current medications    Follow Up  Six-month with labs      Guillermo Early MD, F.A.C.C,  F.S.C.A.I.      Total professional time spent for the encounter: 30 minutes  Time was spent preparing to see the patient, reviewing results of prior testing, obtaining and/or reviewing separately obtained history, performing a medically appropriate examination and interview, counseling and educating the patient/family, ordering medications/tests/procedures, referring and communicating with other health care professionals, documenting clinical information in the electronic health record, and independently interpreting results.    Disclaimer: This document was created using voice recognition software ([employer]). Although it may be edited, this document may contain errors related to incorrect recognition of the spoken word. Please call the physician if clarification is needed.

## 2025-07-02 DIAGNOSIS — Z78.0 MENOPAUSE: ICD-10-CM

## 2025-07-09 ENCOUNTER — PATIENT MESSAGE (OUTPATIENT)
Dept: DERMATOLOGY | Facility: CLINIC | Age: 65
End: 2025-07-09
Payer: COMMERCIAL

## 2025-07-09 ENCOUNTER — TELEPHONE (OUTPATIENT)
Dept: DERMATOLOGY | Facility: CLINIC | Age: 65
End: 2025-07-09
Payer: COMMERCIAL

## 2025-07-09 NOTE — TELEPHONE ENCOUNTER
Copied from CRM #[id number]. Topic: Appointments - Appointment Scheduling  >> Jul 9, 2025  9:49 AM Heather wrote:  Type:  Sooner Apoointment Request    Caller is requesting a sooner appointment.       Name of Caller:pt    When is the first available appointment?dept booked    Symptoms:growth on foot & arm    Would the patient rather a call back or a response via [facility]? Call back    Best Call Back Number:[phone]     Additional Information: pt st she noticed 4 new growths on her body. Pt st the appt in oct is to far out & she feels she needs to be seen sooner. Please call to discuss.

## 2025-07-13 ENCOUNTER — RESULTS FOLLOW-UP (OUTPATIENT)
Dept: PRIMARY CARE CLINIC | Facility: CLINIC | Age: 65
End: 2025-07-13
Payer: COMMERCIAL

## 2025-07-13 RX ORDER — ALENDRONATE SODIUM 70 MG/1
TABLET ORAL
Qty: 12 TABLET | Refills: 3 | Status: SHIPPED | OUTPATIENT
Start: 2025-07-13

## 2025-08-13 DIAGNOSIS — Z12.31 OTHER SCREENING MAMMOGRAM: ICD-10-CM

## 2025-08-20 ENCOUNTER — OFFICE VISIT (OUTPATIENT)
Dept: CARDIOLOGY | Facility: CLINIC | Age: 65
End: 2025-08-20
Payer: COMMERCIAL

## 2025-08-20 VITALS
BODY MASS INDEX: 23.79 KG/M2 | DIASTOLIC BLOOD PRESSURE: 64 MMHG | WEIGHT: 130.06 LBS | SYSTOLIC BLOOD PRESSURE: 114 MMHG | HEART RATE: 74 BPM

## 2025-08-20 DIAGNOSIS — Z79.01 ANTICOAGULANT LONG-TERM USE: ICD-10-CM

## 2025-08-20 DIAGNOSIS — Z29.89 INDICATION PRESENT FOR ENDOCARDITIS PROPHYLAXIS: ICD-10-CM

## 2025-08-20 DIAGNOSIS — Z95.2 HISTORY OF MITRAL VALVE REPLACEMENT: Primary | ICD-10-CM

## 2025-08-20 DIAGNOSIS — E78.49 OTHER HYPERLIPIDEMIA: ICD-10-CM

## 2025-08-20 PROCEDURE — 1101F PT FALLS ASSESS-DOCD LE1/YR: CPT | Mod: CPTII,S$GLB,, | Performed by: INTERNAL MEDICINE

## 2025-08-20 PROCEDURE — 3074F SYST BP LT 130 MM HG: CPT | Mod: CPTII,S$GLB,, | Performed by: INTERNAL MEDICINE

## 2025-08-20 PROCEDURE — 93010 ELECTROCARDIOGRAM REPORT: CPT | Mod: S$GLB,,, | Performed by: INTERNAL MEDICINE

## 2025-08-20 PROCEDURE — 99999 PR PBB SHADOW E&M-EST. PATIENT-LVL III: CPT | Mod: PBBFAC,,, | Performed by: INTERNAL MEDICINE

## 2025-08-20 PROCEDURE — 3078F DIAST BP <80 MM HG: CPT | Mod: CPTII,S$GLB,, | Performed by: INTERNAL MEDICINE

## 2025-08-20 PROCEDURE — 1126F AMNT PAIN NOTED NONE PRSNT: CPT | Mod: CPTII,S$GLB,, | Performed by: INTERNAL MEDICINE

## 2025-08-20 PROCEDURE — 3008F BODY MASS INDEX DOCD: CPT | Mod: CPTII,S$GLB,, | Performed by: INTERNAL MEDICINE

## 2025-08-20 PROCEDURE — 99213 OFFICE O/P EST LOW 20 MIN: CPT | Mod: S$GLB,,, | Performed by: INTERNAL MEDICINE

## 2025-08-20 PROCEDURE — 1159F MED LIST DOCD IN RCRD: CPT | Mod: CPTII,S$GLB,, | Performed by: INTERNAL MEDICINE

## 2025-08-20 PROCEDURE — 3288F FALL RISK ASSESSMENT DOCD: CPT | Mod: CPTII,S$GLB,, | Performed by: INTERNAL MEDICINE

## 2025-08-23 LAB
OHS QRS DURATION: 78 MS
OHS QTC CALCULATION: 457 MS